# Patient Record
Sex: FEMALE | Race: WHITE | NOT HISPANIC OR LATINO | Employment: OTHER | URBAN - NONMETROPOLITAN AREA
[De-identification: names, ages, dates, MRNs, and addresses within clinical notes are randomized per-mention and may not be internally consistent; named-entity substitution may affect disease eponyms.]

---

## 2018-08-21 ENCOUNTER — OFFICE VISIT (OUTPATIENT)
Dept: CARDIOLOGY | Facility: CLINIC | Age: 73
End: 2018-08-21

## 2018-08-21 VITALS
WEIGHT: 144 LBS | HEART RATE: 89 BPM | HEIGHT: 63 IN | DIASTOLIC BLOOD PRESSURE: 80 MMHG | OXYGEN SATURATION: 97 % | SYSTOLIC BLOOD PRESSURE: 150 MMHG | BODY MASS INDEX: 25.52 KG/M2

## 2018-08-21 DIAGNOSIS — I10 ESSENTIAL HYPERTENSION: ICD-10-CM

## 2018-08-21 DIAGNOSIS — R00.2 PALPITATIONS: ICD-10-CM

## 2018-08-21 DIAGNOSIS — E66.3 OVERWEIGHT (BMI 25.0-29.9): ICD-10-CM

## 2018-08-21 DIAGNOSIS — R06.02 SHORTNESS OF BREATH: Primary | ICD-10-CM

## 2018-08-21 PROCEDURE — 99204 OFFICE O/P NEW MOD 45 MIN: CPT | Performed by: INTERNAL MEDICINE

## 2018-08-21 RX ORDER — MULTIPLE VITAMINS W/ MINERALS TAB 9MG-400MCG
1 TAB ORAL DAILY
COMMUNITY

## 2018-08-21 RX ORDER — LEVOTHYROXINE SODIUM 0.05 MG/1
50 TABLET ORAL DAILY
COMMUNITY

## 2018-08-21 RX ORDER — CLONAZEPAM 1 MG/1
1 TABLET ORAL NIGHTLY PRN
COMMUNITY

## 2018-08-21 RX ORDER — FUROSEMIDE 20 MG/1
20 TABLET ORAL AS NEEDED
COMMUNITY

## 2018-08-21 RX ORDER — ESCITALOPRAM OXALATE 20 MG/1
20 TABLET ORAL EVERY MORNING
COMMUNITY

## 2018-08-21 RX ORDER — LORATADINE 10 MG/1
10 TABLET ORAL DAILY
COMMUNITY

## 2018-08-21 RX ORDER — AMITRIPTYLINE HYDROCHLORIDE 10 MG/1
10 TABLET, FILM COATED ORAL DAILY
COMMUNITY

## 2018-08-21 RX ORDER — RANITIDINE 300 MG/1
300 TABLET ORAL NIGHTLY
COMMUNITY

## 2018-08-21 RX ORDER — DEXLANSOPRAZOLE 60 MG/1
60 CAPSULE, DELAYED RELEASE ORAL DAILY
COMMUNITY

## 2018-08-21 RX ORDER — BUSPIRONE HYDROCHLORIDE 10 MG/1
10 TABLET ORAL 2 TIMES DAILY
COMMUNITY

## 2018-08-21 RX ORDER — AMLODIPINE BESYLATE 5 MG/1
5 TABLET ORAL DAILY
COMMUNITY

## 2018-08-22 PROCEDURE — 93000 ELECTROCARDIOGRAM COMPLETE: CPT | Performed by: INTERNAL MEDICINE

## 2018-08-28 ENCOUNTER — HOSPITAL ENCOUNTER (OUTPATIENT)
Dept: CARDIOLOGY | Facility: HOSPITAL | Age: 73
Discharge: HOME | End: 2018-08-28
Attending: INTERNAL MEDICINE | Admitting: INTERNAL MEDICINE

## 2018-08-28 VITALS
DIASTOLIC BLOOD PRESSURE: 80 MMHG | WEIGHT: 143.96 LBS | BODY MASS INDEX: 25.51 KG/M2 | HEIGHT: 63 IN | SYSTOLIC BLOOD PRESSURE: 150 MMHG

## 2018-08-28 DIAGNOSIS — R06.02 SHORTNESS OF BREATH: ICD-10-CM

## 2018-08-28 PROCEDURE — 93306 TTE W/DOPPLER COMPLETE: CPT

## 2018-08-28 PROCEDURE — 93306 TTE W/DOPPLER COMPLETE: CPT | Performed by: INTERNAL MEDICINE

## 2018-08-28 PROCEDURE — 25010000002 PERFLUTREN 6.52 MG/ML SUSPENSION: Performed by: INTERNAL MEDICINE

## 2018-08-28 RX ADMIN — PERFLUTREN: 6.52 INJECTION, SUSPENSION INTRAVENOUS at 10:03

## 2018-08-30 LAB
BH CV ECHO MEAS - AI DEC SLOPE: 136 CM/SEC^2
BH CV ECHO MEAS - AI MAX PG: 50.7 MMHG
BH CV ECHO MEAS - AI MAX VEL: 356 CM/SEC
BH CV ECHO MEAS - AI P1/2T: 766.7 MSEC
BH CV ECHO MEAS - AO MAX PG (FULL): 4.9 MMHG
BH CV ECHO MEAS - AO MAX PG: 10.1 MMHG
BH CV ECHO MEAS - AO MEAN PG (FULL): 0.5 MMHG
BH CV ECHO MEAS - AO MEAN PG: 4.5 MMHG
BH CV ECHO MEAS - AO ROOT AREA (BSA CORRECTED): 1.6
BH CV ECHO MEAS - AO ROOT AREA: 5.3 CM^2
BH CV ECHO MEAS - AO ROOT DIAM: 2.6 CM
BH CV ECHO MEAS - AO V2 MAX: 159 CM/SEC
BH CV ECHO MEAS - AO V2 MEAN: 91.3 CM/SEC
BH CV ECHO MEAS - AO V2 VTI: 27.8 CM
BH CV ECHO MEAS - AVA(I,A): 3.6 CM^2
BH CV ECHO MEAS - AVA(I,D): 3.6 CM^2
BH CV ECHO MEAS - AVA(V,A): 2.2 CM^2
BH CV ECHO MEAS - AVA(V,D): 2.2 CM^2
BH CV ECHO MEAS - BSA(HAYCOCK): 1.7 M^2
BH CV ECHO MEAS - BSA: 1.7 M^2
BH CV ECHO MEAS - BZI_BMI: 25.3 KILOGRAMS/M^2
BH CV ECHO MEAS - BZI_METRIC_HEIGHT: 160 CM
BH CV ECHO MEAS - BZI_METRIC_WEIGHT: 64.9 KG
BH CV ECHO MEAS - EDV(CUBED): 78.4 ML
BH CV ECHO MEAS - EDV(MOD-SP4): 92.8 ML
BH CV ECHO MEAS - EDV(TEICH): 82.2 ML
BH CV ECHO MEAS - EF(CUBED): 69.2 %
BH CV ECHO MEAS - EF(MOD-SP4): 70.5 %
BH CV ECHO MEAS - EF(TEICH): 61.1 %
BH CV ECHO MEAS - ESV(CUBED): 24.1 ML
BH CV ECHO MEAS - ESV(MOD-SP4): 27.4 ML
BH CV ECHO MEAS - ESV(TEICH): 31.9 ML
BH CV ECHO MEAS - FS: 32.5 %
BH CV ECHO MEAS - IVS/LVPW: 1.5
BH CV ECHO MEAS - IVSD: 1 CM
BH CV ECHO MEAS - LA DIMENSION: 3.4 CM
BH CV ECHO MEAS - LA/AO: 1.3
BH CV ECHO MEAS - LAT PEAK E' VEL: 8.5 CM/SEC
BH CV ECHO MEAS - LV DIASTOLIC VOL/BSA (35-75): 55.3 ML/M^2
BH CV ECHO MEAS - LV MASS(C)D: 115.4 GRAMS
BH CV ECHO MEAS - LV MASS(C)DI: 68.8 GRAMS/M^2
BH CV ECHO MEAS - LV MAX PG: 5.2 MMHG
BH CV ECHO MEAS - LV MEAN PG: 4 MMHG
BH CV ECHO MEAS - LV SYSTOLIC VOL/BSA (12-30): 16.3 ML/M^2
BH CV ECHO MEAS - LV V1 MAX: 111.5 CM/SEC
BH CV ECHO MEAS - LV V1 MEAN: 92.8 CM/SEC
BH CV ECHO MEAS - LV V1 VTI: 31.6 CM
BH CV ECHO MEAS - LVIDD: 4.3 CM
BH CV ECHO MEAS - LVIDS: 2.9 CM
BH CV ECHO MEAS - LVLD AP4: 7.7 CM
BH CV ECHO MEAS - LVLS AP4: 6.2 CM
BH CV ECHO MEAS - LVOT AREA (M): 3.1 CM^2
BH CV ECHO MEAS - LVOT AREA: 3.1 CM^2
BH CV ECHO MEAS - LVOT DIAM: 2 CM
BH CV ECHO MEAS - LVPWD: 0.69 CM
BH CV ECHO MEAS - MED PEAK E' VEL: 7.29 CM/SEC
BH CV ECHO MEAS - MR MAX PG: 89.3 MMHG
BH CV ECHO MEAS - MR MAX VEL: 472.5 CM/SEC
BH CV ECHO MEAS - MV A MAX VEL: 110 CM/SEC
BH CV ECHO MEAS - MV DEC TIME: 0.23 SEC
BH CV ECHO MEAS - MV E MAX VEL: 91.7 CM/SEC
BH CV ECHO MEAS - MV E/A: 0.83
BH CV ECHO MEAS - PI END-D VEL: 105 CM/SEC
BH CV ECHO MEAS - RAP SYSTOLE: 5 MMHG
BH CV ECHO MEAS - RVSP: 25.1 MMHG
BH CV ECHO MEAS - SI(AO): 88 ML/M^2
BH CV ECHO MEAS - SI(CUBED): 32.4 ML/M^2
BH CV ECHO MEAS - SI(LVOT): 59.2 ML/M^2
BH CV ECHO MEAS - SI(MOD-SP4): 39 ML/M^2
BH CV ECHO MEAS - SI(TEICH): 29.9 ML/M^2
BH CV ECHO MEAS - SV(AO): 147.6 ML
BH CV ECHO MEAS - SV(CUBED): 54.3 ML
BH CV ECHO MEAS - SV(LVOT): 99.3 ML
BH CV ECHO MEAS - SV(MOD-SP4): 65.4 ML
BH CV ECHO MEAS - SV(TEICH): 50.2 ML
BH CV ECHO MEAS - TR MAX VEL: 224 CM/SEC
BH CV ECHO MEASUREMENTS AVERAGE E/E' RATIO: 11.61
LEFT ATRIUM VOLUME INDEX: 17.7 ML/M2
LEFT ATRIUM VOLUME: 29.7 CM3
MAXIMAL PREDICTED HEART RATE: 147 BPM
STRESS TARGET HR: 125 BPM

## 2019-03-05 ENCOUNTER — OFFICE VISIT (OUTPATIENT)
Dept: CARDIOLOGY | Facility: CLINIC | Age: 74
End: 2019-03-05

## 2019-03-05 VITALS
OXYGEN SATURATION: 98 % | WEIGHT: 145 LBS | HEART RATE: 76 BPM | SYSTOLIC BLOOD PRESSURE: 158 MMHG | HEIGHT: 63 IN | BODY MASS INDEX: 25.69 KG/M2 | DIASTOLIC BLOOD PRESSURE: 80 MMHG

## 2019-03-05 DIAGNOSIS — I10 ESSENTIAL HYPERTENSION: ICD-10-CM

## 2019-03-05 DIAGNOSIS — I35.1 NONRHEUMATIC AORTIC VALVE INSUFFICIENCY: Primary | ICD-10-CM

## 2019-03-05 DIAGNOSIS — R00.2 PALPITATIONS: ICD-10-CM

## 2019-03-05 PROCEDURE — 93000 ELECTROCARDIOGRAM COMPLETE: CPT | Performed by: INTERNAL MEDICINE

## 2019-03-05 PROCEDURE — 99214 OFFICE O/P EST MOD 30 MIN: CPT | Performed by: INTERNAL MEDICINE

## 2019-03-05 RX ORDER — MELOXICAM 15 MG/1
15 TABLET ORAL DAILY
COMMUNITY

## 2019-03-05 NOTE — PROGRESS NOTES
Subjective:     Encounter Date:03/05/2019      Patient ID: Merna Bauer is a 74 y.o. female with mild aortic insufficiency, chronic intermittent palpitations, essential hypertension who presents today for follow-up.    Chief Complaint: Follow-up    Hypertension   This is a chronic problem. The problem is unchanged. The problem is controlled. Associated symptoms include anxiety. Pertinent negatives include no chest pain, headaches, malaise/fatigue, orthopnea, palpitations, peripheral edema, PND or shortness of breath. Agents associated with hypertension include thyroid hormones. Risk factors for coronary artery disease include post-menopausal state. Current antihypertension treatment includes calcium channel blockers. The current treatment provides significant improvement. There are no compliance problems.  There is no history of CAD/MI or heart failure.      This patient presents today for follow-up.  We last saw her at a time when she was having shortness of breath.  She says that her shortness of breath has essentially resolved.  She also has some intermittent palpitations.  These have been rarely occurring at this time.  She thinks that most of her symptoms may have been related to anxiety.  Her  was dealing with a lot of health issues and the patient was quite anxious at that time.  She was placed on medications by Dr. Schuler for her anxiety and she says that her symptoms have almost completely resolved at this time.  She denies chest pain, lightheadedness, dizziness, syncope, orthopnea, PND, significant shortness of breath or dyspnea on exertion.  She has not had any trouble with her medications.  She tells me that her blood pressure is generally well controlled on her current medications.  Overall, she says that she is feeling well at this time.      Current Outpatient Medications:   •  amitriptyline (ELAVIL) 10 MG tablet, Take 10 mg by mouth Daily. Take 2 tablets daily, Disp: , Rfl:   •   amLODIPine (NORVASC) 5 MG tablet, Take 5 mg by mouth Daily., Disp: , Rfl:   •  busPIRone (BUSPAR) 10 MG tablet, Take 10 mg by mouth 2 (Two) Times a Day., Disp: , Rfl:   •  Calcium Carb-Cholecalciferol (CALCIUM 600 + D PO), Take 1 tablet by mouth Daily., Disp: , Rfl:   •  clonazePAM (KlonoPIN) 1 MG tablet, Take 1 mg by mouth At Night As Needed for Seizures., Disp: , Rfl:   •  dexlansoprazole (DEXILANT) 60 MG capsule, Take 60 mg by mouth Daily., Disp: , Rfl:   •  escitalopram (LEXAPRO) 20 MG tablet, Take 20 mg by mouth Every Morning., Disp: , Rfl:   •  furosemide (LASIX) 20 MG tablet, Take 20 mg by mouth As Needed., Disp: , Rfl:   •  levothyroxine (SYNTHROID, LEVOTHROID) 50 MCG tablet, Take 50 mcg by mouth Daily., Disp: , Rfl:   •  loratadine (CLARITIN) 10 MG tablet, Take 10 mg by mouth Daily., Disp: , Rfl:   •  meloxicam (MOBIC) 15 MG tablet, Take 15 mg by mouth Daily., Disp: , Rfl:   •  Multiple Vitamins-Minerals (MULTIVITAMIN WITH MINERALS) tablet tablet, Take 1 tablet by mouth Daily., Disp: , Rfl:   •  Omega-3 Fatty Acids (FISH OIL) 1200 MG capsule delayed-release, Take 1 capsule by mouth 2 (Two) Times a Day., Disp: , Rfl:   •  raNITIdine (ZANTAC) 300 MG tablet, Take 300 mg by mouth Every Night., Disp: , Rfl:     No Known Allergies    Social History     Tobacco Use   • Smoking status: Never Smoker   • Smokeless tobacco: Never Used   Substance Use Topics   • Alcohol use: No     Review of Systems   Constitution: Negative for chills, fever, weakness, malaise/fatigue, night sweats and weight loss.   HENT: Negative for congestion and sore throat.    Cardiovascular: Negative for chest pain, claudication, dyspnea on exertion, irregular heartbeat, leg swelling, orthopnea, palpitations, paroxysmal nocturnal dyspnea and syncope.   Respiratory: Negative for cough, hemoptysis, shortness of breath and wheezing.    Endocrine: Negative for cold intolerance, heat intolerance, polydipsia and polyuria.   Hematologic/Lymphatic:  "Negative for bleeding problem. Does not bruise/bleed easily.   Gastrointestinal: Negative for abdominal pain, hematemesis, hematochezia, melena, nausea and vomiting.   Genitourinary: Negative for bladder incontinence, dysuria and hematuria.   Neurological: Negative for dizziness, headaches, loss of balance, numbness, paresthesias and seizures.       ECG 12 Lead  Date/Time: 3/5/2019 3:06 PM  Performed by: Saud Nunn MD  Authorized by: Saud Nunn MD   Comparison: compared with previous ECG from 8/28/2018  Similar to previous ECG  Rhythm: sinus rhythm  Rate: normal  BPM: 70  Conduction: 1st degree AV block  ST Segments: ST segments normal  T Waves: T waves normal  QRS axis: normal  Other: no other findings    Clinical impression: non-specific ECG               Objective:     Physical Exam   Constitutional: She is oriented to person, place, and time. She appears well-developed and well-nourished. No distress.   HENT:   Head: Normocephalic and atraumatic.   Mouth/Throat: Oropharynx is clear and moist.   Eyes: EOM are normal. Pupils are equal, round, and reactive to light.   Neck: Normal range of motion. Neck supple. No JVD present. No thyromegaly present.   Cardiovascular: Normal rate, regular rhythm, S1 normal, S2 normal, normal heart sounds and intact distal pulses. Exam reveals no gallop and no friction rub.   No murmur heard.  Pulmonary/Chest: Effort normal and breath sounds normal.   Abdominal: Soft. Bowel sounds are normal. She exhibits no distension. There is no tenderness.   Musculoskeletal: Normal range of motion. She exhibits no edema.   Neurological: She is alert and oriented to person, place, and time. No cranial nerve deficit.   Skin: Skin is warm and dry. No rash noted. No cyanosis or erythema. Nails show no clubbing.   Psychiatric: She has a normal mood and affect.   Vitals reviewed.    /80 (BP Location: Right arm, Patient Position: Sitting)   Pulse 76   Ht 160 cm (63\")   " Wt 65.8 kg (145 lb)   SpO2 98%   BMI 25.69 kg/m²     Data/Lab Review:     Echo 8/28/18:  · Left ventricular systolic function is normal. Estimated EF appears to be in the range of 61 - 65%.  · Mild aortic valve regurgitation is present.  · Trace mitral valve regurgitation is present.  · Trace tricuspid valve regurgitation is present. Estimated right ventricular systolic pressure from tricuspid regurgitation is normal (<35 mmHg).      Assessment:          Diagnosis Plan   1. Nonrheumatic aortic valve insufficiency     2. Essential hypertension     3. Palpitations  ECG 12 Lead          Plan:       1.  Aortic insufficiency: The patient had mild aortic insufficiency noticed on her echocardiogram in August 2018.  At this time, she is stable.  She can be followed as needed but was instructed to call or return if she develops a rapid increase in shortness of breath or dyspnea on exertion or any other worrisome symptoms.    2.  Essential hypertension: The patient tells me that her blood pressure is generally very well controlled on her current dose of Norvasc.  Therefore, given that she normally has good control blood pressure, we will not make adjustments based on today's reading which was somewhat elevated.    3.  Palpitations: The patient has a long-standing history of intermittent palpitations but she experiences these very rarely at this time and her symptoms are markedly improved.    Patient's Body mass index is 25.69 kg/m². BMI is above normal parameters. Recommendations include: exercise counseling and nutrition counseling.    Follow-up: as needed at this time at request of patient; however we will be happy to see again if needed.

## 2023-09-25 ENCOUNTER — HOSPITAL ENCOUNTER (EMERGENCY)
Facility: HOSPITAL | Age: 78
Discharge: HOME | End: 2023-09-25
Admitting: EMERGENCY MEDICINE
Payer: MEDICARE

## 2023-09-25 ENCOUNTER — APPOINTMENT (OUTPATIENT)
Dept: CT IMAGING | Facility: HOSPITAL | Age: 78
End: 2023-09-25
Payer: MEDICARE

## 2023-09-25 ENCOUNTER — APPOINTMENT (OUTPATIENT)
Dept: GENERAL RADIOLOGY | Facility: HOSPITAL | Age: 78
End: 2023-09-25
Payer: MEDICARE

## 2023-09-25 VITALS
RESPIRATION RATE: 16 BRPM | TEMPERATURE: 98.7 F | SYSTOLIC BLOOD PRESSURE: 125 MMHG | DIASTOLIC BLOOD PRESSURE: 47 MMHG | HEIGHT: 63 IN | HEART RATE: 68 BPM | OXYGEN SATURATION: 96 % | WEIGHT: 125 LBS | BODY MASS INDEX: 22.15 KG/M2

## 2023-09-25 DIAGNOSIS — S32.592A CLOSED FRACTURE OF LEFT INFERIOR PUBIC RAMUS, INITIAL ENCOUNTER: Primary | ICD-10-CM

## 2023-09-25 DIAGNOSIS — J06.9 UPPER RESPIRATORY TRACT INFECTION, UNSPECIFIED TYPE: ICD-10-CM

## 2023-09-25 PROCEDURE — 73552 X-RAY EXAM OF FEMUR 2/>: CPT

## 2023-09-25 PROCEDURE — 63710000001 ONDANSETRON ODT 4 MG TABLET DISPERSIBLE: Performed by: NURSE PRACTITIONER

## 2023-09-25 PROCEDURE — 99284 EMERGENCY DEPT VISIT MOD MDM: CPT

## 2023-09-25 PROCEDURE — 72192 CT PELVIS W/O DYE: CPT

## 2023-09-25 RX ORDER — OXYCODONE AND ACETAMINOPHEN 7.5; 325 MG/1; MG/1
1 TABLET ORAL ONCE
Status: COMPLETED | OUTPATIENT
Start: 2023-09-25 | End: 2023-09-25

## 2023-09-25 RX ORDER — AMOXICILLIN 500 MG/1
500 CAPSULE ORAL 2 TIMES DAILY
Qty: 20 CAPSULE | Refills: 0 | Status: SHIPPED | OUTPATIENT
Start: 2023-09-25 | End: 2023-10-05

## 2023-09-25 RX ORDER — ONDANSETRON 4 MG/1
4 TABLET, ORALLY DISINTEGRATING ORAL ONCE
Status: COMPLETED | OUTPATIENT
Start: 2023-09-25 | End: 2023-09-25

## 2023-09-25 RX ORDER — HYDROCODONE BITARTRATE AND ACETAMINOPHEN 5; 325 MG/1; MG/1
1 TABLET ORAL EVERY 4 HOURS PRN
Qty: 15 TABLET | Refills: 0 | Status: SHIPPED | OUTPATIENT
Start: 2023-09-25

## 2023-09-25 RX ADMIN — OXYCODONE HYDROCHLORIDE AND ACETAMINOPHEN 1 TABLET: 7.5; 325 TABLET ORAL at 15:15

## 2023-09-25 RX ADMIN — ONDANSETRON 4 MG: 4 TABLET, ORALLY DISINTEGRATING ORAL at 15:15

## 2023-09-25 NOTE — DISCHARGE INSTRUCTIONS
You have a closed fracture of the left inferior pubic ramus- same site from previous fracture (acute on chronic)    Weight bearing as tolerated using your walker, call [facility] for appt- Dr. Gray is on call today for the ER who I spoke to regarding your CT scan however as discussed they may put you with a different provider when you get your appointment

## 2023-09-25 NOTE — ED PROVIDER NOTES
Subjective   History of Present Illness  Patient is a 78-year-old female who presents to the ER with chief complaints of left pelvis pain and leg pain.  Patient states that she sustained a pelvic fracture when she fell back in July of this year.  She states that the fracture was not identified until she had a CT scan performed.  Patient states today she was in her son room and turned awkwardly.  She believes she got tripped up on her footing causing her to fall on her left side.  She complains of pain in particular to her left groin and pelvis region.  She reports pain to her left upper leg.  She is uncertain if she hit her head, states she may have bumped her head however had no loss of consciousness and has had no vomiting or headache symptoms.  She is not on a blood thinner.  We offered to further evaluate her head however she is declining any imaging of the head at this time.  She has no neck pain or back pain.  Denies any loss of bowel or bladder control or saddle anesthesia.  She is concerned about another pelvic fracture.  Past medical history significant for COPD, hypertension, thyroid disease      Review of Systems   Constitutional: Negative.  Negative for fever.   HENT: Negative.  Negative for congestion.    Cardiovascular: Negative.  Negative for chest pain.   Gastrointestinal: Negative.  Negative for abdominal pain, diarrhea, nausea and vomiting.   Genitourinary: Negative.  Negative for dysuria.   Musculoskeletal:  Negative for back pain and neck pain.        Positive for left pelvic pain and left leg pain   Skin: Negative.  Negative for color change and rash.   Neurological: Negative.  Negative for weakness.   All other systems reviewed and are negative.    Past Medical History:   Diagnosis Date    COPD (chronic obstructive pulmonary disease)     Hypertension     Thyroid disease        No Known Allergies    Past Surgical History:   Procedure Laterality Date    CATARACT EXTRACTION Bilateral      DILATATION AND CURETTAGE      x 2    ILEOSTOMY      REFRACTIVE SURGERY Bilateral        Family History   Problem Relation Age of Onset    Bipolar disorder Mother     Heart attack Father     Sick sinus syndrome Sister     Heart disease Brother     Sick sinus syndrome Brother        Social History     Socioeconomic History    Marital status:    Tobacco Use    Smoking status: Never    Smokeless tobacco: Never   Substance and Sexual Activity    Alcohol use: No    Drug use: No    Sexual activity: Defer           Objective   Physical Exam  Vitals and nursing note reviewed.   Constitutional:       Appearance: Normal appearance. She is well-developed.   HENT:      Head: Normocephalic and atraumatic.      Comments: No obvious trauma noted to the head     Right Ear: External ear normal.      Left Ear: External ear normal.      Nose: Nose normal.      Mouth/Throat:      Pharynx: Oropharynx is clear.   Eyes:      Extraocular Movements: Extraocular movements intact.      Conjunctiva/sclera: Conjunctivae normal.      Pupils: Pupils are equal, round, and reactive to light.   Cardiovascular:      Rate and Rhythm: Normal rate and regular rhythm.      Heart sounds: Normal heart sounds.   Pulmonary:      Effort: Pulmonary effort is normal.      Breath sounds: Normal breath sounds.   Abdominal:      General: Bowel sounds are normal.      Palpations: Abdomen is soft.   Musculoskeletal:         General: Tenderness and signs of injury present.      Cervical back: Normal range of motion and neck supple.      Comments: Pain to palpation to the left groin, no significant pain noted to the left hip, range of motion intact, has pain to palpation to the left upper thigh, pulses palpable bilaterally, she is neurologically neurovascularly intact, denies any loss of bowel or bladder control or saddle anesthesia    No pain to palpation to the cervical spine, thoracic spine, or lumbar spine   Skin:     General: Skin is warm and dry.       Capillary Refill: Capillary refill takes less than 2 seconds.   Neurological:      General: No focal deficit present.      Mental Status: She is alert and oriented to person, place, and time.   Psychiatric:         Mood and Affect: Mood normal.         Behavior: Behavior normal.         Thought Content: Thought content normal.         Judgment: Judgment normal.       Procedures           ED Course                                           Medical Decision Making  Patient is a 78-year-old female who presents to the ER with chief complaints of left pelvis pain and leg pain.  Patient states that she sustained a pelvic fracture when she fell back in July of this year.  She states that the fracture was not identified until she had a CT scan performed.  Patient states today she was in her son room and turned awkwardly.  She believes she got tripped up on her footing causing her to fall on her left side.  She complains of pain in particular to her left groin and pelvis region.  She reports pain to her left upper leg.  She is uncertain if she hit her head, states she may have bumped her head however had no loss of consciousness and has had no vomiting or headache symptoms.  She is not on a blood thinner.  We offered to further evaluate her head however she is declining any imaging of the head at this time.  She has no neck pain or back pain.  Denies any loss of bowel or bladder control or saddle anesthesia.  She is concerned about another pelvic fracture.  Past medical history significant for COPD, hypertension, thyroid disease  Differential diagnosis: Bony fracture, dislocation, contusion, sprain, and other    XR Femur 2 View Left   Final Result    1. No femur fracture is identified, there are partially healed fractures    of the superior and inferior left pubic rami, on today's pelvic CT there    is suggestion of  refracture of the inferior left pubic ramus fracture.         This report was signed and finalized on  9/25/2023 3:37 PM CDT by Dr. Smith Gary MD.          CT Pelvis Without Contrast   Final Result    1. Old fractures of the anterior column of the left acetabulum and    inferior left pubic ramus as well as the left sacral wing. There is    questionable acute on chronic fracture involving the left inferior pubic    ramus at the same location as the original fracture. No significant    displacement is identified. No hip fracture is seen.                   This report was signed and finalized on 9/25/2023 2:44 PM CDT by Dr. Smith Gary MD.       CT scan findings are negative for hip fracture however there appears to be an acute on chronic closed fracture of the left inferior pubic ramus.  Discussed with Dr. Gray on-call for orthopedics.  He wants patient to be weightbearing as tolerated.  We will prescribe pain medication and she may follow-up as an outpatient in their office.  Patient states she has a walker that she use the last time she had a pelvic fracture.  We offered to write a prescription for a wheelchair however patient prefers to use her walker.  Patient upon discharge is requesting an antibiotic for upper respiratory infection.  She states she has tried over-the-counter medication without relief.  We will prescribe antibiotics and she may follow-up with her PCP regarding the symptoms.  Patient be discharged home shortly in stable condition.    Problems Addressed:  Closed fracture of left inferior pubic ramus, initial encounter: acute illness or injury  Upper respiratory tract infection, unspecified type: acute illness or injury    Amount and/or Complexity of Data Reviewed  Radiology: ordered. Decision-making details documented in ED Course.    Risk  Prescription drug management.        Final diagnoses:   Closed fracture of left inferior pubic ramus, initial encounter   Upper respiratory tract infection, unspecified type       ED Disposition  ED Disposition       ED Disposition   Discharge     Condition   Stable    Comment   --               Maximino Gray MD  [address] [facility] [address]  [phone]      call tomorrow for appt         Medication List        New Prescriptions      amoxicillin 500 MG capsule  Commonly known as: AMOXIL  Take 1 capsule by mouth 2 (Two) Times a Day for 10 days.     HYDROcodone-acetaminophen 5-325 MG per tablet  Commonly known as: NORCO  Take 1 tablet by mouth Every 4 (Four) Hours As Needed for Moderate Pain.               Where to Get Your Medications        These medications were sent to [facility] - [place], [place] - [address] - [phone]  - [phone]   [address] [address]      Phone: [phone]   amoxicillin 500 MG capsule  HYDROcodone-acetaminophen 5-325 MG per tablet            Mira Johnston, APRN  09/25/23 0813

## 2023-10-23 NOTE — PROGRESS NOTES
Subjective    Ms. Bauer is 78 y.o. female    Chief Complaint: recurrent uti    History of Present Illness    78-year-old female new patient referred for recurrent urinary tract infections over the past 2 years.  Reports has had approximately 7 this year.  3 urine culture positive available for me to review 2 of which showing Klebsiella and 1 E. coli with resistance to Bactrim, ampicillin and tetracycline.  Along with intermediate coverage to nitrofurantoin.  Currently using estradiol vaginal estrogen cream 2-3 nights weekly as well as over-the-counter probiotic.  Reports recently over the past 2 days finished a round of Levaquin and prior to that was prescribed Bactrim earlier this month.  Is currently completely asymptomatic.  Reports when symptoms are present consist of suprapubic pressure urine frequency and urgency.  Denies any urinary incontinence.    The following portions of the patient's history were reviewed and updated as appropriate: allergies, current medications, past family history, past medical history, past social history, past surgical history and problem list.    Review of Systems   Constitutional:  Negative for chills, fatigue and fever.   Gastrointestinal:  Negative for abdominal pain, anal bleeding, blood in stool, nausea and vomiting.   Genitourinary:  Positive for difficulty urinating, frequency and urgency. Negative for dysuria, flank pain, hematuria, pelvic pain and vaginal pain.         Current Outpatient Medications:     amLODIPine (NORVASC) 5 MG tablet, Take 1 tablet by mouth Daily., Disp: , Rfl:     busPIRone (BUSPAR) 10 MG tablet, Take 1 tablet by mouth 2 (Two) Times a Day., Disp: , Rfl:     Calcium Carb-Cholecalciferol (CALCIUM 600 + D PO), Take 1 tablet by mouth Daily., Disp: , Rfl:     citalopram (CeleXA) 40 MG tablet, Take 1 tablet by mouth Daily., Disp: , Rfl:     clonazePAM (KlonoPIN) 1 MG tablet, Take 1 tablet by mouth At Night As Needed for Seizures., Disp: , Rfl:      dexlansoprazole (DEXILANT) 60 MG capsule, Take 1 capsule by mouth Daily., Disp: , Rfl:     furosemide (LASIX) 20 MG tablet, Take 1 tablet by mouth As Needed., Disp: , Rfl:     levothyroxine (SYNTHROID, LEVOTHROID) 50 MCG tablet, Take 1 tablet by mouth Daily., Disp: , Rfl:     loratadine (CLARITIN) 10 MG tablet, Take 1 tablet by mouth Daily., Disp: , Rfl:     metoprolol tartrate (LOPRESSOR) 25 MG tablet, Take 1 tablet by mouth 2 (Two) Times a Day., Disp: , Rfl:     Multiple Vitamins-Minerals (MULTIVITAMIN WITH MINERALS) tablet tablet, Take 1 tablet by mouth Daily., Disp: , Rfl:     Omega-3 Fatty Acids (FISH OIL) 1200 MG capsule delayed-release, Take 1 capsule by mouth 2 (Two) Times a Day., Disp: , Rfl:     amitriptyline (ELAVIL) 10 MG tablet, Take 10 mg by mouth Daily. Take 2 tablets daily (Patient not taking: Reported on 10/30/2023), Disp: , Rfl:     cephalexin (KEFLEX) 250 MG capsule, Take 1 capsule by mouth Daily for 10 days., Disp: 90 capsule, Rfl: 0    escitalopram (LEXAPRO) 20 MG tablet, Take 20 mg by mouth Every Morning. (Patient not taking: Reported on 10/30/2023), Disp: , Rfl:     HYDROcodone-acetaminophen (NORCO) 5-325 MG per tablet, Take 1 tablet by mouth Every 4 (Four) Hours As Needed for Moderate Pain. (Patient not taking: Reported on 10/30/2023), Disp: 15 tablet, Rfl: 0    meloxicam (MOBIC) 15 MG tablet, Take 15 mg by mouth Daily. (Patient not taking: Reported on 10/30/2023), Disp: , Rfl:     raNITIdine (ZANTAC) 300 MG tablet, Take 300 mg by mouth Every Night. (Patient not taking: Reported on 10/30/2023), Disp: , Rfl:     Past Medical History:   Diagnosis Date    COPD (chronic obstructive pulmonary disease)     Hypertension     Thyroid disease        Past Surgical History:   Procedure Laterality Date    CATARACT EXTRACTION Bilateral     DILATATION AND CURETTAGE      x 2    ILEOSTOMY      REFRACTIVE SURGERY Bilateral        Social History     Socioeconomic History    Marital status:    Tobacco Use  "   Smoking status: Never    Smokeless tobacco: Never   Substance and Sexual Activity    Alcohol use: No    Drug use: No    Sexual activity: Defer       Family History   Problem Relation Age of Onset    Bipolar disorder Mother     Heart attack Father     Sick sinus syndrome Sister     Heart disease Brother     Sick sinus syndrome Brother        Objective    Temp 97.8 °F (36.6 °C)   Ht 160 cm (62.99\")   Wt 56.9 kg (125 lb 6.4 oz)   BMI 22.22 kg/m²     Physical Exam  Nursing note reviewed.   Constitutional:       General: She is not in acute distress.     Appearance: Normal appearance. She is not ill-appearing.   HENT:      Nose: No congestion.   Abdominal:      Tenderness: There is no right CVA tenderness or left CVA tenderness.      Hernia: No hernia is present.      Comments: Ileostomy bag   Skin:     General: Skin is warm and dry.   Neurological:      Mental Status: She is alert and oriented to person, place, and time.   Psychiatric:         Mood and Affect: Mood normal.         Behavior: Behavior normal.             Results for orders placed or performed in visit on 10/30/23   POC Urinalysis Dipstick, Multipro    Specimen: Urine   Result Value Ref Range    Color Yellow Yellow, Straw, Dark Yellow, Emilie    Clarity, UA Clear Clear    Glucose, UA Negative Negative mg/dL    Bilirubin Negative Negative    Ketones, UA Negative Negative    Specific Gravity  1.030 1.005 - 1.030    Blood, UA Negative Negative    pH, Urine 5.5 5.0 - 8.0    Protein, POC Negative Negative mg/dL    Urobilinogen, UA 0.2 E.U./dL Normal, 0.2 E.U./dL    Nitrite, UA Negative Negative    Leukocytes Negative Negative     Assessment and Plan    Diagnoses and all orders for this visit:    1. Recurrent UTI (Primary)  -     POC Urinalysis Dipstick, Multipro  -     cephalexin (KEFLEX) 250 MG capsule; Take 1 capsule by mouth Daily for 10 days.  Dispense: 90 capsule; Refill: 0        Recurrent UTI    Continue vaginal estrogen cream 2-3 nights " weekly    Continue OTC probiotic and recommend adding in cranberry daily    Urinalysis at present clear no signs of infection given the frequent recurrence of infection recommend starting on a low-dose daily prophylaxis    Given most recent urine culture we will start patient on low-dose cephalexin 250 mg x 3 months    Previously underwent abdominal ultrasound imaging which revealed no renal stones or masses or hydronephrosis.    If infections continue recommend further evaluation with cystoscopy      Follow-up in 3 months

## 2023-10-30 ENCOUNTER — OFFICE VISIT (OUTPATIENT)
Dept: UROLOGY | Facility: CLINIC | Age: 78
End: 2023-10-30
Payer: MEDICARE

## 2023-10-30 ENCOUNTER — PATIENT ROUNDING (BHMG ONLY) (OUTPATIENT)
Dept: UROLOGY | Facility: CLINIC | Age: 78
End: 2023-10-30
Payer: MEDICARE

## 2023-10-30 VITALS — HEIGHT: 63 IN | TEMPERATURE: 97.8 F | WEIGHT: 125.4 LBS | BODY MASS INDEX: 22.22 KG/M2

## 2023-10-30 DIAGNOSIS — N39.0 RECURRENT UTI: Primary | ICD-10-CM

## 2023-10-30 LAB
BILIRUB BLD-MCNC: NEGATIVE MG/DL
CLARITY, POC: CLEAR
COLOR UR: YELLOW
GLUCOSE UR STRIP-MCNC: NEGATIVE MG/DL
KETONES UR QL: NEGATIVE
LEUKOCYTE EST, POC: NEGATIVE
NITRITE UR-MCNC: NEGATIVE MG/ML
PH UR: 5.5 [PH] (ref 5–8)
PROT UR STRIP-MCNC: NEGATIVE MG/DL
RBC # UR STRIP: NEGATIVE /UL
SP GR UR: 1.03 (ref 1–1.03)
UROBILINOGEN UR QL: NORMAL

## 2023-10-30 PROCEDURE — 1160F RVW MEDS BY RX/DR IN RCRD: CPT

## 2023-10-30 PROCEDURE — 1159F MED LIST DOCD IN RCRD: CPT

## 2023-10-30 PROCEDURE — 81001 URINALYSIS AUTO W/SCOPE: CPT

## 2023-10-30 PROCEDURE — 99204 OFFICE O/P NEW MOD 45 MIN: CPT

## 2023-10-30 RX ORDER — CEPHALEXIN 250 MG/1
250 CAPSULE ORAL DAILY
Qty: 90 CAPSULE | Refills: 0 | Status: SHIPPED | OUTPATIENT
Start: 2023-10-30 | End: 2023-11-09

## 2023-10-30 RX ORDER — CITALOPRAM 40 MG/1
40 TABLET ORAL DAILY
COMMUNITY

## 2023-10-30 NOTE — PROGRESS NOTES
October 30, 2023    Hello, may I speak with Merna Bauer?    My name is Katie      I am  with [facility]  [address] [place] [address], [address]  [facility] [id number] [phone].    Before we get started may I verify your date of birth? 1945    I am calling to officially welcome you to our practice and ask about your recent visit. Is this a good time to talk? yes    Tell me about your visit with us. What things went well?  The visit went well and she was very informative.       We're always looking for ways to make our patients' experiences even better. Do you have recommendations on ways we may improve?  no    Overall were you satisfied with your first visit to our practice? yes       I appreciate you taking the time to speak with me today. Is there anything else I can do for you? no      Thank you, and have a great day.

## 2024-01-17 NOTE — PROGRESS NOTES
Subjective    Ms. Bauer is 79 y.o. female    Chief Complaint: Recurrent UTI follow-up    History of Present Illness      78-year-old female ne78-year-old female established patient in for 3-month follow-up regarding recurrent urinary tract infections.  Was started on low-dose Keflex prophylactic treatment last visit.  Reports 0 breakthrough infections.  Is currently asymptomatic.  Urinalysis is clear.  Has remained on vaginal estrogen cream weekly as well as over-the-counter probiotic and cranberry.  Denies any incontinence.    The following portions of the patient's history were reviewed and updated as appropriate: allergies, current medications, past family history, past medical history, past social history, past surgical history and problem list.    Review of Systems   Constitutional:  Negative for chills and fever.   Gastrointestinal:  Negative for abdominal pain, anal bleeding, blood in stool, nausea and vomiting.   Genitourinary:  Negative for difficulty urinating, dysuria, frequency, hematuria, pelvic pain, urgency and vaginal pain.         Current Outpatient Medications:     amitriptyline (ELAVIL) 10 MG tablet, Take 1 tablet by mouth Daily. Take 2 tablets daily, Disp: , Rfl:     amLODIPine (NORVASC) 5 MG tablet, Take 1 tablet by mouth Daily., Disp: , Rfl:     busPIRone (BUSPAR) 10 MG tablet, Take 1 tablet by mouth 2 (Two) Times a Day., Disp: , Rfl:     Calcium Carb-Cholecalciferol (CALCIUM 600 + D PO), Take 1 tablet by mouth Daily., Disp: , Rfl:     citalopram (CeleXA) 40 MG tablet, Take 1 tablet by mouth Daily., Disp: , Rfl:     clonazePAM (KlonoPIN) 1 MG tablet, Take 1 tablet by mouth At Night As Needed for Seizures., Disp: , Rfl:     dexlansoprazole (DEXILANT) 60 MG capsule, Take 1 capsule by mouth Daily., Disp: , Rfl:     escitalopram (LEXAPRO) 20 MG tablet, Take 1 tablet by mouth Every Morning., Disp: , Rfl:     furosemide (LASIX) 20 MG tablet, Take 1 tablet by mouth As Needed., Disp: , Rfl:      "HYDROcodone-acetaminophen (NORCO) 5-325 MG per tablet, Take 1 tablet by mouth Every 4 (Four) Hours As Needed for Moderate Pain., Disp: 15 tablet, Rfl: 0    levothyroxine (SYNTHROID, LEVOTHROID) 50 MCG tablet, Take 1 tablet by mouth Daily., Disp: , Rfl:     loratadine (CLARITIN) 10 MG tablet, Take 1 tablet by mouth Daily., Disp: , Rfl:     meloxicam (MOBIC) 15 MG tablet, Take 1 tablet by mouth Daily., Disp: , Rfl:     metoprolol tartrate (LOPRESSOR) 25 MG tablet, Take 1 tablet by mouth 2 (Two) Times a Day., Disp: , Rfl:     Multiple Vitamins-Minerals (MULTIVITAMIN WITH MINERALS) tablet tablet, Take 1 tablet by mouth Daily., Disp: , Rfl:     Omega-3 Fatty Acids (FISH OIL) 1200 MG capsule delayed-release, Take 1 capsule by mouth 2 (Two) Times a Day., Disp: , Rfl:     raNITIdine (ZANTAC) 300 MG tablet, Take 1 tablet by mouth Every Night., Disp: , Rfl:     Past Medical History:   Diagnosis Date    COPD (chronic obstructive pulmonary disease)     Hypertension     Thyroid disease        Past Surgical History:   Procedure Laterality Date    CATARACT EXTRACTION Bilateral     DILATATION AND CURETTAGE      x 2    ILEOSTOMY      REFRACTIVE SURGERY Bilateral        Social History     Socioeconomic History    Marital status:    Tobacco Use    Smoking status: Never    Smokeless tobacco: Never   Substance and Sexual Activity    Alcohol use: No    Drug use: No    Sexual activity: Defer       Family History   Problem Relation Age of Onset    Bipolar disorder Mother     Heart attack Father     Sick sinus syndrome Sister     Heart disease Brother     Sick sinus syndrome Brother        Objective    Temp 97.8 °F (36.6 °C)   Ht 160 cm (62.99\")   Wt 61.7 kg (136 lb)   BMI 24.10 kg/m²     Physical Exam  Nursing note reviewed.   Constitutional:       General: She is not in acute distress.     Appearance: Normal appearance. She is not ill-appearing.   HENT:      Nose: No congestion.   Abdominal:      Tenderness: There is no right " CVA tenderness or left CVA tenderness.      Hernia: No hernia is present.   Skin:     General: Skin is warm and dry.   Neurological:      Mental Status: She is alert and oriented to person, place, and time.   Psychiatric:         Mood and Affect: Mood normal.         Behavior: Behavior normal.             Results for orders placed or performed in visit on 01/30/24   POC Urinalysis Dipstick, Multipro    Specimen: Urine   Result Value Ref Range    Color Emilie Yellow, Straw, Dark Yellow, Emilie    Clarity, UA Clear Clear    Glucose, UA Negative Negative mg/dL    Bilirubin Negative Negative    Ketones, UA Negative Negative    Specific Gravity  1.030 1.005 - 1.030    Blood, UA Negative Negative    pH, Urine 5.0 5.0 - 8.0    Protein, POC Negative Negative mg/dL    Urobilinogen, UA 0.2 E.U./dL Normal, 0.2 E.U./dL    Nitrite, UA Negative Negative    Leukocytes Negative Negative     Assessment and Plan    Diagnoses and all orders for this visit:    1. Recurrent UTI (Primary)  -     POC Urinalysis Dipstick, Multipro      Finishing up the 3-month course low-dose Keflex prophylaxis.  0 breakthrough infections reported.  Has remained on vaginal estrogen cream as well as over-the-counter probiotic.  Will hold off on any further antibiotic prophylaxis.  Will continue twice weekly vaginal estrogen cream as well as daily over-the-counter cranberry and probiotic.  Again have encouraged increased fluid intake and follow-up in 6 months

## 2024-01-30 ENCOUNTER — OFFICE VISIT (OUTPATIENT)
Dept: UROLOGY | Facility: CLINIC | Age: 79
End: 2024-01-30
Payer: MEDICARE

## 2024-01-30 VITALS — TEMPERATURE: 97.8 F | HEIGHT: 63 IN | WEIGHT: 136 LBS | BODY MASS INDEX: 24.1 KG/M2

## 2024-01-30 DIAGNOSIS — N39.0 RECURRENT UTI: Primary | ICD-10-CM

## 2024-01-30 LAB
BILIRUB BLD-MCNC: NEGATIVE MG/DL
CLARITY, POC: CLEAR
COLOR UR: NORMAL
GLUCOSE UR STRIP-MCNC: NEGATIVE MG/DL
KETONES UR QL: NEGATIVE
LEUKOCYTE EST, POC: NEGATIVE
NITRITE UR-MCNC: NEGATIVE MG/ML
PH UR: 5 [PH] (ref 5–8)
PROT UR STRIP-MCNC: NEGATIVE MG/DL
RBC # UR STRIP: NEGATIVE /UL
SP GR UR: 1.03 (ref 1–1.03)
UROBILINOGEN UR QL: NORMAL

## 2024-02-14 ENCOUNTER — OFFICE VISIT (OUTPATIENT)
Dept: UROLOGY | Facility: CLINIC | Age: 79
End: 2024-02-14
Payer: MEDICARE

## 2024-02-14 VITALS — TEMPERATURE: 97.4 F | WEIGHT: 136 LBS | HEIGHT: 63 IN | BODY MASS INDEX: 24.1 KG/M2

## 2024-02-14 DIAGNOSIS — N39.0 RECURRENT UTI: Primary | ICD-10-CM

## 2024-02-14 RX ORDER — CEFDINIR 300 MG/1
300 CAPSULE ORAL 2 TIMES DAILY
Qty: 20 CAPSULE | Refills: 0 | Status: SHIPPED | OUTPATIENT
Start: 2024-02-14

## 2024-02-14 RX ORDER — PHENAZOPYRIDINE HYDROCHLORIDE 100 MG/1
1 TABLET, FILM COATED ORAL EVERY 12 HOURS SCHEDULED
COMMUNITY
Start: 2023-10-22

## 2024-02-19 ENCOUNTER — TELEPHONE (OUTPATIENT)
Dept: UROLOGY | Facility: CLINIC | Age: 79
End: 2024-02-19
Payer: MEDICARE

## 2024-02-19 NOTE — TELEPHONE ENCOUNTER
----- Message from LORA Hudson sent at 2/19/2024 10:54 AM CST -----  Urine culture positive for Klebsiella bacteria.  Continue previously prescribed antibiotic

## 2024-02-19 NOTE — TELEPHONE ENCOUNTER
Left patient a voicemail to let her know  Urine culture positive for Klebsiella bacteria.  Continue previously prescribed antibiotic     HUB can read

## 2024-03-05 ENCOUNTER — TELEPHONE (OUTPATIENT)
Dept: UROLOGY | Facility: CLINIC | Age: 79
End: 2024-03-05
Payer: MEDICARE

## 2024-03-05 NOTE — TELEPHONE ENCOUNTER
Provider: VERONICA GIRON    Caller: CHRISSY NIEVES    Relationship to Patient: SELF    Pharmacy: [facility]    Phone Number: [phone]    Reason for Call: PT HAS COMPLETED LAST ANTIBIOTIC - CEFDINIR ABOUT 1 WEEK AGO.      PT IS STARTING TO HAVE PRESSURE AGAIN, AND BURNING.  PT THINKS UTI HAS RETURNED.    PT HAS ALSO BEEN ON A ZPACK-AMOX CALAV 500/125 MG FOR A SINUS INFECTION, WHICH HAS BEEN COMPLETED.    PLEASE REVIEW AND CALL PT IN ANOTHER MEDICATION TO TREAT UTI.    When was the patient last seen: 2/14/24    When did it start: 03/05/24      PLEASE CALL PT TO CONFIRM

## 2024-03-05 NOTE — PROGRESS NOTES
Subjective    Ms. Bauer is 79 y.o. female    Chief Complaint: uti symptoms     History of Present Illness    79-year-old female established patient presents with return of urinary tract infection symptoms suprapubic pressure and burning with urination accompanied with increased frequency.  Patient last treated with cefdinir 2/14/2024 due to Klebsiella positive urine culture.  Patient with an extensive history of recurrent urinary tract infections previously treated with prophylactic course of Keflex.  Has remained on vaginal estrogen cream and is using 2-3 nights weekly along with over-the-counter cranberry.  No recent upper tract imaging.    The following portions of the patient's history were reviewed and updated as appropriate: allergies, current medications, past family history, past medical history, past social history, past surgical history and problem list.    Review of Systems   Constitutional:  Negative for chills and fever.   Gastrointestinal:  Negative for abdominal pain, anal bleeding, blood in stool, nausea and vomiting.   Genitourinary:  Positive for dysuria and urgency. Negative for frequency and hematuria.         Current Outpatient Medications:     amitriptyline (ELAVIL) 10 MG tablet, Take 1 tablet by mouth Daily. Take 2 tablets daily, Disp: , Rfl:     amLODIPine (NORVASC) 5 MG tablet, Take 1 tablet by mouth Daily., Disp: , Rfl:     busPIRone (BUSPAR) 10 MG tablet, Take 1 tablet by mouth 2 (Two) Times a Day., Disp: , Rfl:     Calcium Carb-Cholecalciferol (CALCIUM 600 + D PO), Take 1 tablet by mouth Daily., Disp: , Rfl:     cefdinir (OMNICEF) 300 MG capsule, Take 1 capsule by mouth 2 (Two) Times a Day., Disp: 20 capsule, Rfl: 0    citalopram (CeleXA) 40 MG tablet, Take 1 tablet by mouth Daily., Disp: , Rfl:     clonazePAM (KlonoPIN) 1 MG tablet, Take 1 tablet by mouth At Night As Needed for Seizures., Disp: , Rfl:     dexlansoprazole (DEXILANT) 60 MG capsule, Take 1 capsule by mouth Daily., Disp: ,  Rfl:     escitalopram (LEXAPRO) 20 MG tablet, Take 1 tablet by mouth Every Morning., Disp: , Rfl:     furosemide (LASIX) 20 MG tablet, Take 1 tablet by mouth As Needed., Disp: , Rfl:     HYDROcodone-acetaminophen (NORCO) 5-325 MG per tablet, Take 1 tablet by mouth Every 4 (Four) Hours As Needed for Moderate Pain., Disp: 15 tablet, Rfl: 0    levothyroxine (SYNTHROID, LEVOTHROID) 50 MCG tablet, Take 1 tablet by mouth Daily., Disp: , Rfl:     loratadine (CLARITIN) 10 MG tablet, Take 1 tablet by mouth Daily., Disp: , Rfl:     meloxicam (MOBIC) 15 MG tablet, Take 1 tablet by mouth Daily., Disp: , Rfl:     metoprolol tartrate (LOPRESSOR) 25 MG tablet, Take 1 tablet by mouth 2 (Two) Times a Day., Disp: , Rfl:     Multiple Vitamins-Minerals (MULTIVITAMIN WITH MINERALS) tablet tablet, Take 1 tablet by mouth Daily., Disp: , Rfl:     Omega-3 Fatty Acids (FISH OIL) 1200 MG capsule delayed-release, Take 1 capsule by mouth 2 (Two) Times a Day., Disp: , Rfl:     phenazopyridine (PYRIDIUM) 100 MG tablet, Take 1 tablet by mouth Every 12 (Twelve) Hours., Disp: , Rfl:     raNITIdine (ZANTAC) 300 MG tablet, Take 1 tablet by mouth Every Night., Disp: , Rfl:     sulfamethoxazole-trimethoprim (Bactrim DS) 800-160 MG per tablet, Take 1 tablet by mouth 2 (Two) Times a Day., Disp: 20 tablet, Rfl: 0    Past Medical History:   Diagnosis Date    COPD (chronic obstructive pulmonary disease)     Hypertension     Thyroid disease        Past Surgical History:   Procedure Laterality Date    CATARACT EXTRACTION Bilateral     DILATATION AND CURETTAGE      x 2    ILEOSTOMY      REFRACTIVE SURGERY Bilateral        Social History     Socioeconomic History    Marital status:    Tobacco Use    Smoking status: Never    Smokeless tobacco: Never   Substance and Sexual Activity    Alcohol use: No    Drug use: No    Sexual activity: Defer       Family History   Problem Relation Age of Onset    Bipolar disorder Mother     Heart attack Father     Sick  "sinus syndrome Sister     Heart disease Brother     Sick sinus syndrome Brother        Objective    Temp 97.5 °F (36.4 °C)   Ht 160 cm (62.99\")   Wt 61.7 kg (136 lb)   BMI 24.10 kg/m²     Physical Exam  Nursing note reviewed.   Constitutional:       General: She is not in acute distress.     Appearance: Normal appearance. She is not ill-appearing.   HENT:      Nose: No congestion.   Abdominal:      Tenderness: There is no right CVA tenderness or left CVA tenderness.      Hernia: No hernia is present.   Skin:     General: Skin is warm and dry.   Neurological:      Mental Status: She is alert and oriented to person, place, and time.   Psychiatric:         Mood and Affect: Mood normal.         Behavior: Behavior normal.             Results for orders placed or performed in visit on 03/06/24   POC Urinalysis Dipstick, Multipro    Specimen: Urine   Result Value Ref Range    Color Yellow Yellow, Straw, Dark Yellow, Emilie    Clarity, UA Slightly Cloudy (A) Clear    Glucose, UA Negative Negative mg/dL    Bilirubin Negative Negative    Ketones, UA Negative Negative    Specific Gravity  1.030 1.005 - 1.030    Blood, UA Large (A) Negative    pH, Urine 5.5 5.0 - 8.0    Protein,  mg/dL (A) Negative mg/dL    Urobilinogen, UA 0.2 E.U./dL Normal, 0.2 E.U./dL    Nitrite, UA Negative Negative    Leukocytes Moderate (2+) (A) Negative     Assessment and Plan    Diagnoses and all orders for this visit:    1. Recurrent UTI (Primary)  -     POC Urinalysis Dipstick, Multipro  -     US Renal Bilateral; Future  -     Urine Culture - Urine, Urine, Random Void  -     sulfamethoxazole-trimethoprim (Bactrim DS) 800-160 MG per tablet; Take 1 tablet by mouth 2 (Two) Times a Day.  Dispense: 20 tablet; Refill: 0        Return of UTI symptoms urine suspicious today for infection.  Bacteria noted under the microscope.  Will again send urine for culture.  Will go ahead and treat with Bactrim DS.  Given that patient continues to experience 1 " infection after another we will place orders for renal ultrasound.  Will likely benefit from starting back on prophylactic course in the near future.  Will have patient return in 2 weeks for follow-up and if urine looks good we will consider starting back on prophylaxis.

## 2024-03-05 NOTE — TELEPHONE ENCOUNTER
Patient called in wanting a antibiotic for her UTI.. I let her know we can not just send in a antibiotic without her being seen. I have made patient an appointment for next day. 3/6/2024.

## 2024-03-06 ENCOUNTER — OFFICE VISIT (OUTPATIENT)
Dept: UROLOGY | Facility: CLINIC | Age: 79
End: 2024-03-06
Payer: MEDICARE

## 2024-03-06 VITALS — WEIGHT: 136 LBS | TEMPERATURE: 97.5 F | HEIGHT: 63 IN | BODY MASS INDEX: 24.1 KG/M2

## 2024-03-06 DIAGNOSIS — N39.0 RECURRENT UTI: Primary | ICD-10-CM

## 2024-03-06 LAB
BILIRUB BLD-MCNC: NEGATIVE MG/DL
CLARITY, POC: ABNORMAL
COLOR UR: YELLOW
GLUCOSE UR STRIP-MCNC: NEGATIVE MG/DL
KETONES UR QL: NEGATIVE
LEUKOCYTE EST, POC: ABNORMAL
NITRITE UR-MCNC: NEGATIVE MG/ML
PH UR: 5.5 [PH] (ref 5–8)
PROT UR STRIP-MCNC: ABNORMAL MG/DL
RBC # UR STRIP: ABNORMAL /UL
SP GR UR: 1.03 (ref 1–1.03)
UROBILINOGEN UR QL: ABNORMAL

## 2024-03-06 PROCEDURE — 87086 URINE CULTURE/COLONY COUNT: CPT

## 2024-03-06 PROCEDURE — 87186 SC STD MICRODIL/AGAR DIL: CPT

## 2024-03-06 RX ORDER — SULFAMETHOXAZOLE AND TRIMETHOPRIM 800; 160 MG/1; MG/1
1 TABLET ORAL 2 TIMES DAILY
Qty: 20 TABLET | Refills: 0 | Status: SHIPPED | OUTPATIENT
Start: 2024-03-06

## 2024-03-08 ENCOUNTER — TELEPHONE (OUTPATIENT)
Dept: UROLOGY | Facility: CLINIC | Age: 79
End: 2024-03-08
Payer: MEDICARE

## 2024-03-08 LAB — BACTERIA SPEC AEROBE CULT: ABNORMAL

## 2024-03-08 NOTE — TELEPHONE ENCOUNTER
Spoke with patient to let her know   Culture positive for bacteria please continue on previously prescribed antibiotic     Patient verbalized understanding. She wanted to be transferred to scheduling to have her Ultrasound scheduled.

## 2024-03-08 NOTE — TELEPHONE ENCOUNTER
----- Message from LORA Hudson sent at 3/8/2024 11:50 AM CST -----  Culture positive for bacteria please continue on previously prescribed antibiotic

## 2024-03-11 NOTE — PROGRESS NOTES
Subjective    Ms. Bauer is 79 y.o. female    Chief Complaint: recurrent uti follow up     History of Present Illness    79-year-old female established patient in for urine recheck due to ongoing stock with recurrent infections.  Was last treated with Bactrim DS due to culture positive E. coli.  Previous history of prophylactic Keflex due to the infections.  Shortly after completing prophylactic course infections returned.  Has remained on vaginal estrogen cream 2-3 nights weekly along with over-the-counter cranberry and probiotic.  Recently underwent renal ultrasound imaging revealing 2 questionable stones within the left kidney.  Unable to fully evaluate the bladder due to under distention.        The following portions of the patient's history were reviewed and updated as appropriate: allergies, current medications, past family history, past medical history, past social history, past surgical history and problem list.    Review of Systems   Constitutional:  Negative for chills and fever.   Gastrointestinal:  Negative for abdominal pain, anal bleeding, blood in stool, nausea and vomiting.   Genitourinary:  Negative for difficulty urinating, dysuria, flank pain, frequency, hematuria, pelvic pain and urgency.         Current Outpatient Medications:     amitriptyline (ELAVIL) 10 MG tablet, Take 1 tablet by mouth Daily. Take 2 tablets daily, Disp: , Rfl:     amLODIPine (NORVASC) 5 MG tablet, Take 1 tablet by mouth Daily., Disp: , Rfl:     busPIRone (BUSPAR) 10 MG tablet, Take 1 tablet by mouth 2 (Two) Times a Day., Disp: , Rfl:     Calcium Carb-Cholecalciferol (CALCIUM 600 + D PO), Take 1 tablet by mouth Daily., Disp: , Rfl:     cefdinir (OMNICEF) 300 MG capsule, Take 1 capsule by mouth 2 (Two) Times a Day., Disp: 20 capsule, Rfl: 0    citalopram (CeleXA) 40 MG tablet, Take 1 tablet by mouth Daily., Disp: , Rfl:     clonazePAM (KlonoPIN) 1 MG tablet, Take 1 tablet by mouth At Night As Needed for Seizures., Disp: ,  Rfl:     dexlansoprazole (DEXILANT) 60 MG capsule, Take 1 capsule by mouth Daily., Disp: , Rfl:     escitalopram (LEXAPRO) 20 MG tablet, Take 1 tablet by mouth Every Morning., Disp: , Rfl:     furosemide (LASIX) 20 MG tablet, Take 1 tablet by mouth As Needed., Disp: , Rfl:     HYDROcodone-acetaminophen (NORCO) 5-325 MG per tablet, Take 1 tablet by mouth Every 4 (Four) Hours As Needed for Moderate Pain., Disp: 15 tablet, Rfl: 0    levothyroxine (SYNTHROID, LEVOTHROID) 50 MCG tablet, Take 1 tablet by mouth Daily., Disp: , Rfl:     loratadine (CLARITIN) 10 MG tablet, Take 1 tablet by mouth Daily., Disp: , Rfl:     meloxicam (MOBIC) 15 MG tablet, Take 1 tablet by mouth Daily., Disp: , Rfl:     metoprolol tartrate (LOPRESSOR) 25 MG tablet, Take 1 tablet by mouth 2 (Two) Times a Day., Disp: , Rfl:     Multiple Vitamins-Minerals (MULTIVITAMIN WITH MINERALS) tablet tablet, Take 1 tablet by mouth Daily., Disp: , Rfl:     Omega-3 Fatty Acids (FISH OIL) 1200 MG capsule delayed-release, Take 1 capsule by mouth 2 (Two) Times a Day., Disp: , Rfl:     phenazopyridine (PYRIDIUM) 100 MG tablet, Take 1 tablet by mouth Every 12 (Twelve) Hours., Disp: , Rfl:     raNITIdine (ZANTAC) 300 MG tablet, Take 1 tablet by mouth Every Night., Disp: , Rfl:     sulfamethoxazole-trimethoprim (Bactrim DS) 800-160 MG per tablet, Take 1 tablet by mouth 2 (Two) Times a Day., Disp: 20 tablet, Rfl: 0    nitrofurantoin (MACRODANTIN) 50 MG capsule, Take 1 capsule by mouth Every Night., Disp: 90 capsule, Rfl: 0    Past Medical History:   Diagnosis Date    COPD (chronic obstructive pulmonary disease)     Hypertension     Thyroid disease        Past Surgical History:   Procedure Laterality Date    CATARACT EXTRACTION Bilateral     DILATATION AND CURETTAGE      x 2    ILEOSTOMY      REFRACTIVE SURGERY Bilateral        Social History     Socioeconomic History    Marital status:    Tobacco Use    Smoking status: Never    Smokeless tobacco: Never  "  Substance and Sexual Activity    Alcohol use: No    Drug use: No    Sexual activity: Defer       Family History   Problem Relation Age of Onset    Bipolar disorder Mother     Heart attack Father     Sick sinus syndrome Sister     Heart disease Brother     Sick sinus syndrome Brother        Objective    Temp 97.1 °F (36.2 °C)   Ht 160 cm (62.99\")   Wt 61.7 kg (136 lb)   BMI 24.10 kg/m²     Physical Exam  Nursing note reviewed.   Constitutional:       General: She is not in acute distress.     Appearance: Normal appearance. She is not ill-appearing.   HENT:      Nose: No congestion.   Abdominal:      Tenderness: There is no right CVA tenderness or left CVA tenderness.      Hernia: No hernia is present.   Skin:     General: Skin is warm and dry.   Neurological:      Mental Status: She is alert and oriented to person, place, and time.   Psychiatric:         Mood and Affect: Mood normal.         Behavior: Behavior normal.             Results for orders placed or performed in visit on 03/21/24   POC Urinalysis Dipstick, Multipro    Specimen: Urine   Result Value Ref Range    Color Emilie Yellow, Straw, Dark Yellow, Emilie    Clarity, UA Clear Clear    Glucose, UA Negative Negative mg/dL    Bilirubin Negative Negative    Ketones, UA Negative Negative    Specific Gravity  1.030 1.005 - 1.030    Blood, UA Negative Negative    pH, Urine 5.5 5.0 - 8.0    Protein, POC Negative Negative mg/dL    Urobilinogen, UA 0.2 E.U./dL Normal, 0.2 E.U./dL    Nitrite, UA Negative Negative    Leukocytes Negative Negative     Assessment and Plan    Diagnoses and all orders for this visit:    1. Recurrent UTI (Primary)  -     POC Urinalysis Dipstick, Multipro  -     nitrofurantoin (MACRODANTIN) 50 MG capsule; Take 1 capsule by mouth Every Night.  Dispense: 90 capsule; Refill: 0      Based on urinalysis today infection appears to be gone with this being said recommend starting back on a prophylactic course of antibiotic given that patient " continues to stock such frequent infections with symptoms.  Will start on nitrofurantoin 50 mg nightly    Follow-up 3 months    Will continue vaginal estrogen cream as well as OTC cranberry and probiotic

## 2024-03-14 ENCOUNTER — HOSPITAL ENCOUNTER (OUTPATIENT)
Dept: ULTRASOUND IMAGING | Facility: HOSPITAL | Age: 79
Discharge: HOME | End: 2024-03-14
Payer: MEDICARE

## 2024-03-14 DIAGNOSIS — N39.0 RECURRENT UTI: ICD-10-CM

## 2024-03-14 PROCEDURE — 76775 US EXAM ABDO BACK WALL LIM: CPT

## 2024-03-15 ENCOUNTER — TELEPHONE (OUTPATIENT)
Dept: UROLOGY | Facility: CLINIC | Age: 79
End: 2024-03-15
Payer: MEDICARE

## 2024-03-15 NOTE — TELEPHONE ENCOUNTER
----- Message from LORA Hudson sent at 3/15/2024 11:26 AM CDT -----  Unable to evaluate the bladder due to being compressed.  Patient with a possible nonobstructing stone x 2 left kidney

## 2024-03-15 NOTE — TELEPHONE ENCOUNTER
Spoke with patient to let her know her US   Unable to evaluate the bladder due to being compressed. Patient with a possible nonobstructing stone x 2 left kidney     Patient verbalized understanding.

## 2024-03-15 NOTE — PROGRESS NOTES
Unable to evaluate the bladder due to being compressed.  Patient with a possible nonobstructing stone x 2 left kidney

## 2024-03-21 ENCOUNTER — OFFICE VISIT (OUTPATIENT)
Dept: UROLOGY | Facility: CLINIC | Age: 79
End: 2024-03-21
Payer: MEDICARE

## 2024-03-21 VITALS — HEIGHT: 63 IN | BODY MASS INDEX: 24.1 KG/M2 | WEIGHT: 136 LBS | TEMPERATURE: 97.1 F

## 2024-03-21 DIAGNOSIS — N39.0 RECURRENT UTI: Primary | ICD-10-CM

## 2024-03-21 LAB
BILIRUB BLD-MCNC: NEGATIVE MG/DL
CLARITY, POC: CLEAR
COLOR UR: NORMAL
GLUCOSE UR STRIP-MCNC: NEGATIVE MG/DL
KETONES UR QL: NEGATIVE
LEUKOCYTE EST, POC: NEGATIVE
NITRITE UR-MCNC: NEGATIVE MG/ML
PH UR: 5.5 [PH] (ref 5–8)
PROT UR STRIP-MCNC: NEGATIVE MG/DL
RBC # UR STRIP: NEGATIVE /UL
SP GR UR: 1.03 (ref 1–1.03)
UROBILINOGEN UR QL: NORMAL

## 2024-03-21 RX ORDER — NITROFURANTOIN MACROCRYSTALS 50 MG/1
50 CAPSULE ORAL NIGHTLY
Qty: 90 CAPSULE | Refills: 0 | Status: SHIPPED | OUTPATIENT
Start: 2024-03-21

## 2024-06-07 NOTE — PROGRESS NOTES
Subjective    Ms. Bauer is 79 y.o. female    Chief Complaint: Follow-up recurrent UTI    History of Present Illness    79-year-old female established patient in for follow-up due to ongoing stock with recurrent urinary tract infections.  Previous history of prophylactic Keflex due to the infections. Shortly after completing prophylactic course infections returned. Has remained on vaginal estrogen cream 2-3 nights weekly along with over-the-counter cranberry and probiotic. Recently underwent renal ultrasound imaging revealing 2 questionable stones within the left kidney. Unable to fully evaluate the bladder due to under distention.  Last visit was started on prophylactic nitrofurantoin.  0 reported infections since.  Currently asymptomatic.    The following portions of the patient's history were reviewed and updated as appropriate: allergies, current medications, past family history, past medical history, past social history, past surgical history and problem list.    Review of Systems   Constitutional:  Negative for chills and fever.   Gastrointestinal:  Negative for abdominal pain, anal bleeding and blood in stool.   Genitourinary:  Negative for dysuria and hematuria.         Current Outpatient Medications:     amLODIPine (NORVASC) 5 MG tablet, Take 1 tablet by mouth Daily., Disp: , Rfl:     busPIRone (BUSPAR) 10 MG tablet, Take 1 tablet by mouth 2 (Two) Times a Day., Disp: , Rfl:     Calcium Carb-Cholecalciferol (CALCIUM 600 + D PO), Take 1 tablet by mouth Daily., Disp: , Rfl:     citalopram (CeleXA) 40 MG tablet, Take 1 tablet by mouth Daily., Disp: , Rfl:     clonazePAM (KlonoPIN) 1 MG tablet, Take 1 tablet by mouth At Night As Needed for Seizures., Disp: , Rfl:     dexlansoprazole (DEXILANT) 60 MG capsule, Take 1 capsule by mouth Daily., Disp: , Rfl:     DULoxetine (CYMBALTA) 60 MG capsule, , Disp: , Rfl:     escitalopram (LEXAPRO) 20 MG tablet, Take 1 tablet by mouth Every Morning., Disp: , Rfl:      furosemide (LASIX) 20 MG tablet, Take 1 tablet by mouth As Needed., Disp: , Rfl:     HYDROcodone-acetaminophen (NORCO) 5-325 MG per tablet, Take 1 tablet by mouth Every 4 (Four) Hours As Needed for Moderate Pain., Disp: 15 tablet, Rfl: 0    levothyroxine (SYNTHROID, LEVOTHROID) 50 MCG tablet, Take 1 tablet by mouth Daily., Disp: , Rfl:     loratadine (CLARITIN) 10 MG tablet, Take 1 tablet by mouth Daily., Disp: , Rfl:     meloxicam (MOBIC) 15 MG tablet, Take 1 tablet by mouth Daily., Disp: , Rfl:     metoprolol tartrate (LOPRESSOR) 25 MG tablet, Take 1 tablet by mouth 2 (Two) Times a Day., Disp: , Rfl:     Multiple Vitamins-Minerals (MULTIVITAMIN WITH MINERALS) tablet tablet, Take 1 tablet by mouth Daily., Disp: , Rfl:     nitrofurantoin (MACRODANTIN) 50 MG capsule, Take 1 capsule by mouth Every Night., Disp: 90 capsule, Rfl: 0    Omega-3 Fatty Acids (FISH OIL) 1200 MG capsule delayed-release, Take 1 capsule by mouth 2 (Two) Times a Day., Disp: , Rfl:     phenazopyridine (PYRIDIUM) 100 MG tablet, Take 1 tablet by mouth Every 12 (Twelve) Hours., Disp: , Rfl:     raNITIdine (ZANTAC) 300 MG tablet, Take 1 tablet by mouth Every Night., Disp: , Rfl:     amitriptyline (ELAVIL) 10 MG tablet, Take 1 tablet by mouth Daily. Take 2 tablets daily (Patient not taking: Reported on 6/18/2024), Disp: , Rfl:     cefdinir (OMNICEF) 300 MG capsule, Take 1 capsule by mouth 2 (Two) Times a Day. (Patient not taking: Reported on 6/18/2024), Disp: 20 capsule, Rfl: 0    sulfamethoxazole-trimethoprim (Bactrim DS) 800-160 MG per tablet, Take 1 tablet by mouth 2 (Two) Times a Day. (Patient not taking: Reported on 6/18/2024), Disp: 20 tablet, Rfl: 0    Past Medical History:   Diagnosis Date    COPD (chronic obstructive pulmonary disease)     Hypertension     Thyroid disease        Past Surgical History:   Procedure Laterality Date    CATARACT EXTRACTION Bilateral     DILATATION AND CURETTAGE      x 2    ILEOSTOMY      REFRACTIVE SURGERY  "Bilateral        Social History     Socioeconomic History    Marital status:    Tobacco Use    Smoking status: Never     Passive exposure: Never    Smokeless tobacco: Never   Vaping Use    Vaping status: Never Used   Substance and Sexual Activity    Alcohol use: No    Drug use: No    Sexual activity: Defer       Family History   Problem Relation Age of Onset    Bipolar disorder Mother     Heart attack Father     Sick sinus syndrome Sister     Heart disease Brother     Sick sinus syndrome Brother        Objective    Temp 97.4 °F (36.3 °C)   Ht 160 cm (62.99\")   Wt 61.7 kg (136 lb)   BMI 24.10 kg/m²     Physical Exam  Nursing note reviewed.   Constitutional:       General: She is not in acute distress.     Appearance: Normal appearance. She is not ill-appearing.   HENT:      Nose: No congestion.   Abdominal:      Tenderness: There is no right CVA tenderness or left CVA tenderness.      Hernia: No hernia is present.   Skin:     General: Skin is warm and dry.   Neurological:      Mental Status: She is alert and oriented to person, place, and time.   Psychiatric:         Mood and Affect: Mood normal.         Behavior: Behavior normal.             Results for orders placed or performed in visit on 06/18/24   POC Urinalysis Dipstick, Multipro    Specimen: Urine   Result Value Ref Range    Color Yellow Yellow, Straw, Dark Yellow, Emilie    Clarity, UA Clear Clear    Glucose, UA Negative Negative mg/dL    Bilirubin Negative Negative    Ketones, UA Trace (A) Negative    Specific Gravity  1.030 1.005 - 1.030    Blood, UA Negative Negative    pH, Urine 5.5 5.0 - 8.0    Protein, POC Negative Negative mg/dL    Urobilinogen, UA 0.2 E.U./dL Normal, 0.2 E.U./dL    Nitrite, UA Negative Negative    Leukocytes Trace (A) Negative     Assessment and Plan    Diagnoses and all orders for this visit:    1. Recurrent UTI (Primary)  -     POC Urinalysis Dipstick, Multipro  -     nitrofurantoin (MACRODANTIN) 50 MG capsule; Take 1 " capsule by mouth Every Night.  Dispense: 90 capsule; Refill: 0      Completed 3 months nitrofurantoin prophylaxis.  0 reported infections during.  Currently asymptomatic.  Would like to continue for an additional 3 months.  Will send in a new prescription.  Urinalysis good today.    Follow-up 3 months    Continue vaginal estrogen cream 2-3 nights weekly along with OTC cranberry and probiotic

## 2024-06-18 ENCOUNTER — OFFICE VISIT (OUTPATIENT)
Dept: UROLOGY | Facility: CLINIC | Age: 79
End: 2024-06-18
Payer: MEDICARE

## 2024-06-18 VITALS — BODY MASS INDEX: 24.1 KG/M2 | HEIGHT: 63 IN | TEMPERATURE: 97.4 F | WEIGHT: 136 LBS

## 2024-06-18 DIAGNOSIS — N39.0 RECURRENT UTI: Primary | ICD-10-CM

## 2024-06-18 LAB
BILIRUB BLD-MCNC: NEGATIVE MG/DL
CLARITY, POC: CLEAR
COLOR UR: YELLOW
GLUCOSE UR STRIP-MCNC: NEGATIVE MG/DL
KETONES UR QL: ABNORMAL
LEUKOCYTE EST, POC: ABNORMAL
NITRITE UR-MCNC: NEGATIVE MG/ML
PH UR: 5.5 [PH] (ref 5–8)
PROT UR STRIP-MCNC: NEGATIVE MG/DL
RBC # UR STRIP: NEGATIVE /UL
SP GR UR: 1.03 (ref 1–1.03)
UROBILINOGEN UR QL: ABNORMAL

## 2024-06-18 RX ORDER — DULOXETIN HYDROCHLORIDE 60 MG/1
CAPSULE, DELAYED RELEASE ORAL
COMMUNITY
Start: 2024-06-17

## 2024-06-18 RX ORDER — NITROFURANTOIN MACROCRYSTALS 50 MG/1
50 CAPSULE ORAL NIGHTLY
Qty: 90 CAPSULE | Refills: 0 | Status: SHIPPED | OUTPATIENT
Start: 2024-06-18

## 2024-09-17 ENCOUNTER — OFFICE VISIT (OUTPATIENT)
Dept: UROLOGY | Facility: CLINIC | Age: 79
End: 2024-09-17
Payer: MEDICARE

## 2024-09-17 VITALS — HEIGHT: 63 IN | WEIGHT: 136 LBS | TEMPERATURE: 97.5 F | BODY MASS INDEX: 24.1 KG/M2

## 2024-09-17 DIAGNOSIS — N39.0 RECURRENT UTI: Primary | ICD-10-CM

## 2024-09-17 PROCEDURE — 99214 OFFICE O/P EST MOD 30 MIN: CPT

## 2024-09-17 PROCEDURE — 81001 URINALYSIS AUTO W/SCOPE: CPT

## 2024-09-17 PROCEDURE — 1159F MED LIST DOCD IN RCRD: CPT

## 2024-09-17 PROCEDURE — 1160F RVW MEDS BY RX/DR IN RCRD: CPT

## 2024-09-17 RX ORDER — ASPIRIN 81 MG/1
81 TABLET ORAL DAILY
COMMUNITY

## 2024-09-17 RX ORDER — ATORVASTATIN CALCIUM 20 MG/1
20 TABLET, FILM COATED ORAL DAILY
COMMUNITY
Start: 2024-09-16

## 2024-10-31 ENCOUNTER — OFFICE VISIT (OUTPATIENT)
Dept: UROLOGY | Facility: CLINIC | Age: 79
End: 2024-10-31
Payer: MEDICARE

## 2024-10-31 VITALS — HEIGHT: 63 IN | WEIGHT: 138 LBS | TEMPERATURE: 98 F | BODY MASS INDEX: 24.45 KG/M2

## 2024-10-31 DIAGNOSIS — N39.0 RECURRENT UTI: Primary | ICD-10-CM

## 2024-10-31 LAB
BILIRUB BLD-MCNC: NEGATIVE MG/DL
CLARITY, POC: ABNORMAL
COLOR UR: ABNORMAL
GLUCOSE UR STRIP-MCNC: NEGATIVE MG/DL
KETONES UR QL: NEGATIVE
LEUKOCYTE EST, POC: ABNORMAL
NITRITE UR-MCNC: POSITIVE MG/ML
PH UR: 5.5 [PH] (ref 5–8)
PROT UR STRIP-MCNC: ABNORMAL MG/DL
RBC # UR STRIP: ABNORMAL /UL
SP GR UR: 1.02 (ref 1–1.03)
UROBILINOGEN UR QL: ABNORMAL

## 2024-10-31 PROCEDURE — 87186 SC STD MICRODIL/AGAR DIL: CPT

## 2024-10-31 PROCEDURE — 87086 URINE CULTURE/COLONY COUNT: CPT

## 2024-10-31 PROCEDURE — 87077 CULTURE AEROBIC IDENTIFY: CPT

## 2024-10-31 RX ORDER — FLUCONAZOLE 150 MG/1
150 TABLET ORAL DAILY
Qty: 2 TABLET | Refills: 0 | Status: SHIPPED | OUTPATIENT
Start: 2024-10-31

## 2024-10-31 RX ORDER — ESTRADIOL 0.1 MG/G
CREAM VAGINAL
COMMUNITY
Start: 2024-10-26

## 2024-10-31 RX ORDER — CEFDINIR 300 MG/1
300 CAPSULE ORAL 2 TIMES DAILY
Qty: 20 CAPSULE | Refills: 0 | Status: SHIPPED | OUTPATIENT
Start: 2024-10-31

## 2024-10-31 RX ORDER — AMLODIPINE BESYLATE 10 MG/1
TABLET ORAL
COMMUNITY
Start: 2024-09-27

## 2024-10-31 RX ORDER — PHENAZOPYRIDINE HYDROCHLORIDE 100 MG/1
100 TABLET, FILM COATED ORAL EVERY 12 HOURS SCHEDULED
Qty: 20 TABLET | Refills: 0 | Status: SHIPPED | OUTPATIENT
Start: 2024-10-31

## 2024-10-31 NOTE — PROGRESS NOTES
Subjective    Ms. Bauer is 79 y.o. female    Chief Complaint:reTurn of UTI symptoms    History of Present Illness    79-year-old female established patient seen for history of chronic cystitis last seen 1 month ago.  Reporting return of UTI symptoms suprapubic pain, urine frequency, urgency, burning with urination.  Had been on nitrofurantoin prophylaxis that was stopped last month as she had completed 6 full months this round.  Has remained on over-the-counter supplementation cranberry and probiotic.  Along with prescription vaginal estrogen cream 3 nights weekly.  Currently requiring Pyridium usage due to discomfort.     renal ultrasound imaging  2 stones within the left kidney.     The following portions of the patient's history were reviewed and updated as appropriate: allergies, current medications, past family history, past medical history, past social history, past surgical history and problem list.    Review of Systems   Constitutional:  Negative for chills, fatigue and fever.   Gastrointestinal:  Negative for nausea and vomiting.   Genitourinary:  Positive for dysuria, frequency, pelvic pain and urgency.         Current Outpatient Medications:     amitriptyline (ELAVIL) 25 MG tablet, Take 1 tablet by mouth every night at bedtime., Disp: , Rfl:     amLODIPine (NORVASC) 10 MG tablet, , Disp: , Rfl:     aspirin 81 MG EC tablet, Take 1 tablet by mouth Daily., Disp: , Rfl:     atorvastatin (LIPITOR) 20 MG tablet, Take 1 tablet by mouth Daily., Disp: , Rfl:     busPIRone (BUSPAR) 10 MG tablet, Take 1 tablet by mouth 2 (Two) Times a Day., Disp: , Rfl:     Calcium Carb-Cholecalciferol (CALCIUM 600 + D PO), Take 1 tablet by mouth Daily., Disp: , Rfl:     citalopram (CeleXA) 40 MG tablet, Take 1 tablet by mouth Daily., Disp: , Rfl:     clonazePAM (KlonoPIN) 1 MG tablet, Take 1 tablet by mouth At Night As Needed for Seizures., Disp: , Rfl:     dexlansoprazole (DEXILANT) 60 MG capsule, Take 1 capsule by mouth Daily.,  Disp: , Rfl:     DULoxetine (CYMBALTA) 60 MG capsule, , Disp: , Rfl:     escitalopram (LEXAPRO) 20 MG tablet, Take 1 tablet by mouth Every Morning., Disp: , Rfl:     estradiol (ESTRACE) 0.1 MG/GM vaginal cream, , Disp: , Rfl:     furosemide (LASIX) 20 MG tablet, Take 1 tablet by mouth As Needed., Disp: , Rfl:     HYDROcodone-acetaminophen (NORCO) 5-325 MG per tablet, Take 1 tablet by mouth Every 4 (Four) Hours As Needed for Moderate Pain., Disp: 15 tablet, Rfl: 0    levothyroxine (SYNTHROID, LEVOTHROID) 50 MCG tablet, Take 1 tablet by mouth Daily., Disp: , Rfl:     loratadine (CLARITIN) 10 MG tablet, Take 1 tablet by mouth Daily., Disp: , Rfl:     meloxicam (MOBIC) 15 MG tablet, Take 1 tablet by mouth Daily., Disp: , Rfl:     metoprolol tartrate (LOPRESSOR) 25 MG tablet, Take 1 tablet by mouth 2 (Two) Times a Day., Disp: , Rfl:     Multiple Vitamins-Minerals (MULTIVITAMIN WITH MINERALS) tablet tablet, Take 1 tablet by mouth Daily., Disp: , Rfl:     Omega-3 Fatty Acids (FISH OIL) 1200 MG capsule delayed-release, Take 1 capsule by mouth 2 (Two) Times a Day., Disp: , Rfl:     phenazopyridine (PYRIDIUM) 100 MG tablet, Take 1 tablet by mouth Every 12 (Twelve) Hours., Disp: 20 tablet, Rfl: 0    raNITIdine (ZANTAC) 300 MG tablet, Take 1 tablet by mouth Every Night., Disp: , Rfl:     amitriptyline (ELAVIL) 10 MG tablet, Take 1 tablet by mouth Daily. Take 2 tablets daily (Patient not taking: Reported on 10/31/2024), Disp: , Rfl:     amLODIPine (NORVASC) 5 MG tablet, Take 1 tablet by mouth Daily. (Patient not taking: Reported on 10/31/2024), Disp: , Rfl:     cefdinir (OMNICEF) 300 MG capsule, Take 1 capsule by mouth 2 (Two) Times a Day., Disp: 20 capsule, Rfl: 0    fluconazole (DIFLUCAN) 150 MG tablet, Take 1 tablet by mouth Daily., Disp: 2 tablet, Rfl: 0    nitrofurantoin (MACRODANTIN) 50 MG capsule, Take 1 capsule by mouth Every Night. (Patient not taking: Reported on 10/31/2024), Disp: 90 capsule, Rfl: 0     "sulfamethoxazole-trimethoprim (Bactrim DS) 800-160 MG per tablet, Take 1 tablet by mouth 2 (Two) Times a Day. (Patient not taking: Reported on 10/31/2024), Disp: 20 tablet, Rfl: 0    Past Medical History:   Diagnosis Date    COPD (chronic obstructive pulmonary disease)     Hypertension     Thyroid disease        Past Surgical History:   Procedure Laterality Date    CATARACT EXTRACTION Bilateral     DILATATION AND CURETTAGE      x 2    ILEOSTOMY      REFRACTIVE SURGERY Bilateral        Social History     Socioeconomic History    Marital status:    Tobacco Use    Smoking status: Never     Passive exposure: Never    Smokeless tobacco: Never   Vaping Use    Vaping status: Never Used   Substance and Sexual Activity    Alcohol use: No    Drug use: No    Sexual activity: Defer       Family History   Problem Relation Age of Onset    Bipolar disorder Mother     Heart attack Father     Sick sinus syndrome Sister     Heart disease Brother     Sick sinus syndrome Brother        Objective    Temp 98 °F (36.7 °C) (Infrared)   Ht 160 cm (62.99\")   Wt 62.6 kg (138 lb)   BMI 24.45 kg/m²     Physical Exam  Nursing note reviewed.   Constitutional:       General: She is not in acute distress.     Appearance: Normal appearance. She is not ill-appearing.   HENT:      Nose: No congestion.   Abdominal:      Tenderness: There is no right CVA tenderness or left CVA tenderness.      Hernia: No hernia is present.   Skin:     General: Skin is warm and dry.   Neurological:      Mental Status: She is alert and oriented to person, place, and time.   Psychiatric:         Mood and Affect: Mood normal.         Behavior: Behavior normal.             Results for orders placed or performed in visit on 10/31/24   POC Urinalysis Dipstick, Multipro    Collection Time: 10/31/24 10:23 AM    Specimen: Urine   Result Value Ref Range    Color Orange (A) Yellow, Straw, Dark Yellow, Emilie    Clarity, UA Cloudy (A) Clear    Glucose, UA Negative Negative " mg/dL    Bilirubin Negative Negative    Ketones, UA Negative Negative    Specific Gravity  1.025 1.005 - 1.030    Blood, UA Trace (A) Negative    pH, Urine 5.5 5.0 - 8.0    Protein,  mg/dL (A) Negative mg/dL    Urobilinogen, UA 0.2 E.U./dL Normal, 0.2 E.U./dL    Nitrite, UA Positive (A) Negative    Leukocytes Small (1+) (A) Negative     Assessment and Plan    Diagnoses and all orders for this visit:    1. Recurrent UTI (Primary)  -     POC Urinalysis Dipstick, Multipro  -     Urine Culture - , Urine, Random Void  -     phenazopyridine (PYRIDIUM) 100 MG tablet; Take 1 tablet by mouth Every 12 (Twelve) Hours.  Dispense: 20 tablet; Refill: 0  -     fluconazole (DIFLUCAN) 150 MG tablet; Take 1 tablet by mouth Daily.  Dispense: 2 tablet; Refill: 0  -     cefdinir (OMNICEF) 300 MG capsule; Take 1 capsule by mouth 2 (Two) Times a Day.  Dispense: 20 capsule; Refill: 0      Urinalysis today is abnormal-likely due to Pyridium usage therefore unable to fully evaluate.  Will go ahead and send urine for culture today and start patient on antibiotic based off of most recent culture.  Will contact patient if antibiotic needing changed once culture returns.    Keep follow-up with me in December

## 2024-11-02 LAB — BACTERIA SPEC AEROBE CULT: ABNORMAL

## 2024-11-04 ENCOUNTER — TELEPHONE (OUTPATIENT)
Dept: UROLOGY | Facility: CLINIC | Age: 79
End: 2024-11-04
Payer: MEDICARE

## 2024-11-04 NOTE — TELEPHONE ENCOUNTER
----- Message from Sofy Brown sent at 11/4/2024  8:36 AM CST -----  Urine culture positive for bacteria E. coli.  Continue previously prescribed cefdinir

## 2024-11-04 NOTE — TELEPHONE ENCOUNTER
Spoke with patients spouse to let them know..    Urine culture positive for bacteria E. coli.  Continue previously prescribed cefdinir

## 2024-11-11 NOTE — TELEPHONE ENCOUNTER
Pt called back for follow up plan from completing abx. Pt denies any symptoms/problems. I advised her to keep f/u appt in December

## 2024-11-21 ENCOUNTER — OFFICE VISIT (OUTPATIENT)
Dept: UROLOGY | Facility: CLINIC | Age: 79
End: 2024-11-21
Payer: MEDICARE

## 2024-11-21 VITALS — HEIGHT: 63 IN | BODY MASS INDEX: 24.63 KG/M2 | TEMPERATURE: 97.1 F | WEIGHT: 139 LBS

## 2024-11-21 DIAGNOSIS — N39.0 RECURRENT UTI: Primary | ICD-10-CM

## 2024-11-21 LAB
BILIRUB BLD-MCNC: NEGATIVE MG/DL
CLARITY, POC: CLEAR
COLOR UR: YELLOW
GLUCOSE UR STRIP-MCNC: NEGATIVE MG/DL
KETONES UR QL: NEGATIVE
LEUKOCYTE EST, POC: ABNORMAL
NITRITE UR-MCNC: NEGATIVE MG/ML
PH UR: 5.5 [PH] (ref 5–8)
PROT UR STRIP-MCNC: ABNORMAL MG/DL
RBC # UR STRIP: ABNORMAL /UL
SP GR UR: 1.03 (ref 1–1.03)
UROBILINOGEN UR QL: ABNORMAL

## 2024-11-21 PROCEDURE — 87086 URINE CULTURE/COLONY COUNT: CPT

## 2024-11-21 PROCEDURE — 87186 SC STD MICRODIL/AGAR DIL: CPT

## 2024-11-21 PROCEDURE — 87077 CULTURE AEROBIC IDENTIFY: CPT

## 2024-11-21 RX ORDER — FLUCONAZOLE 100 MG/1
100 TABLET ORAL DAILY
Qty: 3 TABLET | Refills: 0 | Status: SHIPPED | OUTPATIENT
Start: 2024-11-21 | End: 2024-11-24

## 2024-11-21 NOTE — PROGRESS NOTES
Subjective    Ms. Bauer is 79 y.o. female    Chief Complaint: Chronic cystitis    History of Present Illness    79-year-old female established patient in with return of burning with urination and suprapubic pain and vaginal itching.  Patient with an extensive history of chronic cystitis seen less than 1 month ago in clinic urine culture again revealing E. Coli.  History of nitrofurantoin prophylaxis for which was stopped over the last couple months.  Has remained on over-the-counter supplementation cranberry and probiotic along with vaginal estrogen cream 3 nights weekly and natural coconut oil lubricant on off days.      renal ultrasound imaging  2 stones within the left kidney.     The following portions of the patient's history were reviewed and updated as appropriate: allergies, current medications, past family history, past medical history, past social history, past surgical history and problem list.    Review of Systems   Constitutional:  Negative for chills, fatigue and fever.   Gastrointestinal:  Negative for nausea and vomiting.   Genitourinary:  Positive for dysuria and vaginal pain. Negative for frequency and urgency.         Current Outpatient Medications:     amitriptyline (ELAVIL) 25 MG tablet, Take 1 tablet by mouth every night at bedtime., Disp: , Rfl:     amLODIPine (NORVASC) 10 MG tablet, , Disp: , Rfl:     aspirin 81 MG EC tablet, Take 1 tablet by mouth Daily., Disp: , Rfl:     atorvastatin (LIPITOR) 20 MG tablet, Take 1 tablet by mouth Daily., Disp: , Rfl:     busPIRone (BUSPAR) 10 MG tablet, Take 1 tablet by mouth 2 (Two) Times a Day., Disp: , Rfl:     Calcium Carb-Cholecalciferol (CALCIUM 600 + D PO), Take 1 tablet by mouth Daily., Disp: , Rfl:     cefdinir (OMNICEF) 300 MG capsule, Take 1 capsule by mouth 2 (Two) Times a Day., Disp: 20 capsule, Rfl: 0    citalopram (CeleXA) 40 MG tablet, Take 1 tablet by mouth Daily., Disp: , Rfl:     clonazePAM (KlonoPIN) 1 MG tablet, Take 1 tablet by mouth  At Night As Needed for Seizures., Disp: , Rfl:     dexlansoprazole (DEXILANT) 60 MG capsule, Take 1 capsule by mouth Daily., Disp: , Rfl:     DULoxetine (CYMBALTA) 60 MG capsule, , Disp: , Rfl:     escitalopram (LEXAPRO) 20 MG tablet, Take 1 tablet by mouth Every Morning., Disp: , Rfl:     estradiol (ESTRACE) 0.1 MG/GM vaginal cream, , Disp: , Rfl:     furosemide (LASIX) 20 MG tablet, Take 1 tablet by mouth As Needed., Disp: , Rfl:     HYDROcodone-acetaminophen (NORCO) 5-325 MG per tablet, Take 1 tablet by mouth Every 4 (Four) Hours As Needed for Moderate Pain., Disp: 15 tablet, Rfl: 0    levothyroxine (SYNTHROID, LEVOTHROID) 50 MCG tablet, Take 1 tablet by mouth Daily., Disp: , Rfl:     loratadine (CLARITIN) 10 MG tablet, Take 1 tablet by mouth Daily., Disp: , Rfl:     meloxicam (MOBIC) 15 MG tablet, Take 1 tablet by mouth Daily., Disp: , Rfl:     metoprolol tartrate (LOPRESSOR) 25 MG tablet, Take 1 tablet by mouth 2 (Two) Times a Day., Disp: , Rfl:     Multiple Vitamins-Minerals (MULTIVITAMIN WITH MINERALS) tablet tablet, Take 1 tablet by mouth Daily., Disp: , Rfl:     Omega-3 Fatty Acids (FISH OIL) 1200 MG capsule delayed-release, Take 1 capsule by mouth 2 (Two) Times a Day., Disp: , Rfl:     phenazopyridine (PYRIDIUM) 100 MG tablet, Take 1 tablet by mouth Every 12 (Twelve) Hours., Disp: 20 tablet, Rfl: 0    raNITIdine (ZANTAC) 300 MG tablet, Take 1 tablet by mouth Every Night., Disp: , Rfl:     amitriptyline (ELAVIL) 10 MG tablet, Take 1 tablet by mouth Daily. Take 2 tablets daily (Patient not taking: Reported on 11/21/2024), Disp: , Rfl:     amLODIPine (NORVASC) 5 MG tablet, Take 1 tablet by mouth Daily. (Patient not taking: Reported on 11/21/2024), Disp: , Rfl:     fluconazole (Diflucan) 100 MG tablet, Take 1 tablet by mouth Daily for 3 days., Disp: 3 tablet, Rfl: 0    nitrofurantoin (MACRODANTIN) 50 MG capsule, Take 1 capsule by mouth Every Night. (Patient not taking: Reported on 11/21/2024), Disp: 90  "capsule, Rfl: 0    sulfamethoxazole-trimethoprim (Bactrim DS) 800-160 MG per tablet, Take 1 tablet by mouth 2 (Two) Times a Day. (Patient not taking: Reported on 11/21/2024), Disp: 20 tablet, Rfl: 0    Past Medical History:   Diagnosis Date    COPD (chronic obstructive pulmonary disease)     Hypertension     Thyroid disease        Past Surgical History:   Procedure Laterality Date    CATARACT EXTRACTION Bilateral     DILATATION AND CURETTAGE      x 2    ILEOSTOMY      REFRACTIVE SURGERY Bilateral        Social History     Socioeconomic History    Marital status:    Tobacco Use    Smoking status: Never     Passive exposure: Never    Smokeless tobacco: Never   Vaping Use    Vaping status: Never Used   Substance and Sexual Activity    Alcohol use: No    Drug use: No    Sexual activity: Defer       Family History   Problem Relation Age of Onset    Bipolar disorder Mother     Heart attack Father     Sick sinus syndrome Sister     Heart disease Brother     Sick sinus syndrome Brother        Objective    Temp 97.1 °F (36.2 °C)   Ht 160 cm (62.99\")   Wt 63 kg (139 lb)   BMI 24.63 kg/m²     Physical Exam  Nursing note reviewed.   Constitutional:       General: She is not in acute distress.     Appearance: Normal appearance. She is not ill-appearing.   HENT:      Nose: No congestion.   Abdominal:      Tenderness: There is no right CVA tenderness or left CVA tenderness.      Hernia: No hernia is present.   Skin:     General: Skin is warm and dry.   Neurological:      Mental Status: She is alert and oriented to person, place, and time.   Psychiatric:         Mood and Affect: Mood normal.         Behavior: Behavior normal.             Results for orders placed or performed in visit on 11/21/24   POC Urinalysis Dipstick, Multipro    Collection Time: 11/21/24  1:06 PM    Specimen: Urine   Result Value Ref Range    Color Yellow Yellow, Straw, Dark Yellow, Emilie    Clarity, UA Clear Clear    Glucose, UA Negative Negative " mg/dL    Bilirubin Negative Negative    Ketones, UA Negative Negative    Specific Gravity  1.030 1.005 - 1.030    Blood, UA Trace (A) Negative    pH, Urine 5.5 5.0 - 8.0    Protein, POC 30 mg/dL (A) Negative mg/dL    Urobilinogen, UA 0.2 E.U./dL Normal, 0.2 E.U./dL    Nitrite, UA Negative Negative    Leukocytes Small (1+) (A) Negative     Assessment and Plan    Diagnoses and all orders for this visit:    1. Recurrent UTI (Primary)  -     POC Urinalysis Dipstick, Multipro  -     Urine Culture - , Urine, Random Void  -     fluconazole (Diflucan) 100 MG tablet; Take 1 tablet by mouth Daily for 3 days.  Dispense: 3 tablet; Refill: 0      Will send urine back off for culture today will wait to treat with antibiotic until culture returns.  Patient reporting significant vaginal itching will send in more Diflucan    Keep scheduled follow-up December

## 2024-11-23 LAB — BACTERIA SPEC AEROBE CULT: ABNORMAL

## 2024-11-24 DIAGNOSIS — N30.00 ACUTE CYSTITIS WITHOUT HEMATURIA: Primary | ICD-10-CM

## 2024-11-25 ENCOUNTER — TELEPHONE (OUTPATIENT)
Dept: UROLOGY | Facility: CLINIC | Age: 79
End: 2024-11-25
Payer: MEDICARE

## 2024-11-25 NOTE — TELEPHONE ENCOUNTER
----- Message from Sofy Brown sent at 11/24/2024  2:21 PM CST -----  Urine culture positive for bacteria have sent in Augmentin to patient's pharmacy

## 2024-11-25 NOTE — TELEPHONE ENCOUNTER
Spoke with patient to let her know   Urine culture positive for bacteria have sent in Augmentin to patient's pharmacy     Patient verbalized understanding.

## 2024-12-12 NOTE — PROGRESS NOTES
Subjective    Ms. Bauer is 79 y.o. female    Chief Complaint: Recurrent UTI follow-up and for urine recheck    History of Present Illness    79-year-old female established patient in for urine recheck regarding history of frequent recurrent urinary tract infections last completed Augmentin 2 weeks ago due to again culture positive E. coli.  Patient now overall asymptomatic other than report of vaginal itching and vaginal pain.  Concerned for a yeast infection.  Remains on vaginal estrogen cream 3 nights weekly and coconut oil.  Previous history of nitrofurantoin prophylaxis that has worked well for patient in the past.     renal ultrasound imaging  2 stones within the left kidney.     The following portions of the patient's history were reviewed and updated as appropriate: allergies, current medications, past family history, past medical history, past social history, past surgical history and problem list.    Review of Systems   Constitutional:  Negative for chills, fatigue and fever.   Gastrointestinal:  Negative for nausea and vomiting.   Genitourinary:  Positive for vaginal pain.         Current Outpatient Medications:     amitriptyline (ELAVIL) 25 MG tablet, Take 1 tablet by mouth every night at bedtime., Disp: , Rfl:     amLODIPine (NORVASC) 10 MG tablet, , Disp: , Rfl:     aspirin 81 MG EC tablet, Take 1 tablet by mouth Daily., Disp: , Rfl:     atorvastatin (LIPITOR) 20 MG tablet, Take 1 tablet by mouth Daily., Disp: , Rfl:     busPIRone (BUSPAR) 10 MG tablet, Take 1 tablet by mouth 2 (Two) Times a Day., Disp: , Rfl:     Calcium Carb-Cholecalciferol (CALCIUM 600 + D PO), Take 1 tablet by mouth Daily., Disp: , Rfl:     citalopram (CeleXA) 40 MG tablet, Take 1 tablet by mouth Daily., Disp: , Rfl:     clonazePAM (KlonoPIN) 1 MG tablet, Take 1 tablet by mouth At Night As Needed for Seizures., Disp: , Rfl:     dexlansoprazole (DEXILANT) 60 MG capsule, Take 1 capsule by mouth Daily., Disp: , Rfl:     DULoxetine  (CYMBALTA) 60 MG capsule, , Disp: , Rfl:     escitalopram (LEXAPRO) 20 MG tablet, Take 1 tablet by mouth Every Morning., Disp: , Rfl:     estradiol (ESTRACE) 0.1 MG/GM vaginal cream, , Disp: , Rfl:     furosemide (LASIX) 20 MG tablet, Take 1 tablet by mouth As Needed., Disp: , Rfl:     HYDROcodone-acetaminophen (NORCO) 5-325 MG per tablet, Take 1 tablet by mouth Every 4 (Four) Hours As Needed for Moderate Pain., Disp: 15 tablet, Rfl: 0    levothyroxine (SYNTHROID, LEVOTHROID) 100 MCG tablet, Take 1 tablet by mouth Daily., Disp: , Rfl:     loratadine (CLARITIN) 10 MG tablet, Take 1 tablet by mouth Daily., Disp: , Rfl:     meloxicam (MOBIC) 15 MG tablet, Take 1 tablet by mouth Daily., Disp: , Rfl:     metoprolol tartrate (LOPRESSOR) 25 MG tablet, Take 1 tablet by mouth 2 (Two) Times a Day., Disp: , Rfl:     Multiple Vitamins-Minerals (MULTIVITAMIN WITH MINERALS) tablet tablet, Take 1 tablet by mouth Daily., Disp: , Rfl:     Omega-3 Fatty Acids (FISH OIL) 1200 MG capsule delayed-release, Take 1 capsule by mouth 2 (Two) Times a Day., Disp: , Rfl:     raNITIdine (ZANTAC) 300 MG tablet, Take 1 tablet by mouth Every Night., Disp: , Rfl:     fluconazole (Diflucan) 100 MG tablet, Take 1 tablet by mouth Daily for 5 doses., Disp: 5 tablet, Rfl: 0    nitrofurantoin (MACRODANTIN) 50 MG capsule, Take 1 capsule by mouth Every Night., Disp: 90 capsule, Rfl: 0    phenazopyridine (PYRIDIUM) 100 MG tablet, Take 1 tablet by mouth Every 12 (Twelve) Hours. (Patient not taking: Reported on 12/19/2024), Disp: 20 tablet, Rfl: 0    Past Medical History:   Diagnosis Date    COPD (chronic obstructive pulmonary disease)     Hypertension     Thyroid disease        Past Surgical History:   Procedure Laterality Date    CATARACT EXTRACTION Bilateral     DILATATION AND CURETTAGE      x 2    ILEOSTOMY      REFRACTIVE SURGERY Bilateral        Social History     Socioeconomic History    Marital status:    Tobacco Use    Smoking status: Never      "Passive exposure: Never    Smokeless tobacco: Never   Vaping Use    Vaping status: Never Used   Substance and Sexual Activity    Alcohol use: No    Drug use: No    Sexual activity: Defer       Family History   Problem Relation Age of Onset    Bipolar disorder Mother     Heart attack Father     Sick sinus syndrome Sister     Heart disease Brother     Sick sinus syndrome Brother        Objective    Temp 98.2 °F (36.8 °C) (Temporal)   Ht 160 cm (63\")   Wt 63 kg (139 lb)   BMI 24.62 kg/m²     Physical Exam  Nursing note reviewed.   Constitutional:       General: She is not in acute distress.     Appearance: Normal appearance. She is not ill-appearing.   HENT:      Nose: No congestion.   Abdominal:      Tenderness: There is no right CVA tenderness or left CVA tenderness.      Hernia: No hernia is present.   Skin:     General: Skin is warm and dry.   Neurological:      Mental Status: She is alert and oriented to person, place, and time.   Psychiatric:         Mood and Affect: Mood normal.         Behavior: Behavior normal.             Results for orders placed or performed in visit on 12/19/24   POC Urinalysis Dipstick, Multipro    Collection Time: 12/19/24  1:10 PM    Specimen: Urine   Result Value Ref Range    Color Yellow Yellow, Straw, Dark Yellow, Emilie    Clarity, UA Clear Clear    Glucose, UA Negative Negative mg/dL    Bilirubin Negative Negative    Ketones, UA Negative Negative    Specific Gravity  1.030 1.005 - 1.030    Blood, UA Negative Negative    pH, Urine 5.0 5.0 - 8.0    Protein, POC Negative Negative mg/dL    Urobilinogen, UA 0.2 E.U./dL Normal, 0.2 E.U./dL    Nitrite, UA Negative Negative    Leukocytes Trace (A) Negative     Assessment and Plan    Diagnoses and all orders for this visit:    1. Recurrent UTI (Primary)  -     POC Urinalysis Dipstick, Multipro  -     fluconazole (Diflucan) 100 MG tablet; Take 1 tablet by mouth Daily for 5 doses.  Dispense: 5 tablet; Refill: 0  -     nitrofurantoin " (MACRODANTIN) 50 MG capsule; Take 1 capsule by mouth Every Night.  Dispense: 90 capsule; Refill: 0      Urinalysis much improved.  Would now like to start back on low-dose antibiotic we will again try nitrofurantoin.  Will send in as needed Diflucan for yeast have instructed patient only to use when needed    folLow up 3 months

## 2024-12-19 ENCOUNTER — OFFICE VISIT (OUTPATIENT)
Dept: UROLOGY | Facility: CLINIC | Age: 79
End: 2024-12-19
Payer: MEDICARE

## 2024-12-19 VITALS — TEMPERATURE: 98.2 F | BODY MASS INDEX: 24.63 KG/M2 | WEIGHT: 139 LBS | HEIGHT: 63 IN

## 2024-12-19 DIAGNOSIS — N39.0 RECURRENT UTI: Primary | ICD-10-CM

## 2024-12-19 LAB
BILIRUB BLD-MCNC: NEGATIVE MG/DL
CLARITY, POC: CLEAR
COLOR UR: YELLOW
GLUCOSE UR STRIP-MCNC: NEGATIVE MG/DL
KETONES UR QL: NEGATIVE
LEUKOCYTE EST, POC: ABNORMAL
NITRITE UR-MCNC: NEGATIVE MG/ML
PH UR: 5 [PH] (ref 5–8)
PROT UR STRIP-MCNC: NEGATIVE MG/DL
RBC # UR STRIP: NEGATIVE /UL
SP GR UR: 1.03 (ref 1–1.03)
UROBILINOGEN UR QL: ABNORMAL

## 2024-12-19 RX ORDER — NITROFURANTOIN MACROCRYSTALS 50 MG/1
50 CAPSULE ORAL NIGHTLY
Qty: 90 CAPSULE | Refills: 0 | Status: SHIPPED | OUTPATIENT
Start: 2024-12-19

## 2024-12-19 RX ORDER — FLUCONAZOLE 100 MG/1
100 TABLET ORAL DAILY
Qty: 5 TABLET | Refills: 0 | Status: SHIPPED | OUTPATIENT
Start: 2024-12-19 | End: 2024-12-24

## 2024-12-19 RX ORDER — LEVOTHYROXINE SODIUM 100 UG/1
1 TABLET ORAL DAILY
COMMUNITY
Start: 2024-12-15

## 2025-02-13 PROBLEM — M18.12 PRIMARY OSTEOARTHRITIS OF FIRST CARPOMETACARPAL JOINT OF LEFT HAND: Status: ACTIVE | Noted: 2025-02-13

## 2025-02-13 PROBLEM — M18.11 PRIMARY OSTEOARTHRITIS OF FIRST CARPOMETACARPAL JOINT OF RIGHT HAND: Status: ACTIVE | Noted: 2025-02-13

## 2025-02-13 NOTE — PROGRESS NOTES
Take 1 tablet by mouth 2 times daily   Yes Canadce Nur MD   Multiple Vitamins-Minerals (THERA M PLUS) TABS Take 1 tablet by mouth daily   Yes Candace Nur MD   nitrofurantoin (MACRODANTIN) 50 MG capsule Take 1 capsule by mouth nightly 12/19/24  Yes Candace Nur MD   HYDROcodone-acetaminophen (NORCO) 7.5-325 MG per tablet Take 1 tablet by mouth every 6 hours as needed for Pain. Max Daily Amount: 4 tablets   Yes Candace Nur MD   Lactobacillus (ACIDOPHILUS PO) Take by mouth   Yes Candace Nur MD   D-Mannose 500 MG CAPS Take by mouth   Yes Candace Nur MD       Allergies:  Patient has no known allergies.    Social History:   Social History     Occupational History    Not on file   Tobacco Use    Smoking status: Never    Smokeless tobacco: Never   Substance and Sexual Activity    Alcohol use: Not on file    Drug use: Not on file    Sexual activity: Not on file        Family History:   No family history on file.    Review of Systems  System  Neg/Pos  Details  Constitutional  Negative  Chills, Fatigue, Fever and Night Sweats  Respiratory  Negative  Chest Pain, Cough and Dyspnea  Cardio   Negative  Leg Swelling  GI   Negative  Abdominal Pain, Constipation, Nausea and Vomiting     Negative  Urinary Incontinence   Endocrine  Negative  Weight Gain and Weight Loss  MS   Negative  Except as noted in HPI and Chief Complaint    PHYSICAL EXAM:    Vitals:   Vitals:    02/14/25 1031   Weight: 64 kg (141 lb)   Height: 1.6 m (5' 3\")     General:  Appears stated age, no distress.  Orientation:  Alert and oriented to time, place, and person.  Mood and Affect:  Cooperative and pleasant.  Gait: Heel to gait  Cardiovascular:  Symmetric 1-2 plus pulses in upper and lower extremities.  Lymph:  No cervical or inguinal lymphadenopathy noted.  Sensation:  Grossly intact to light touch.  DTR:  Normal, no pathologic reflexes.  Coordination/balance:  Normal    Musculoskeletal:   BILATERAL

## 2025-02-14 ENCOUNTER — OFFICE VISIT (OUTPATIENT)
Age: 80
End: 2025-02-14

## 2025-02-14 VITALS — BODY MASS INDEX: 24.98 KG/M2 | HEIGHT: 63 IN | WEIGHT: 141 LBS

## 2025-02-14 DIAGNOSIS — M18.11 PRIMARY OSTEOARTHRITIS OF FIRST CARPOMETACARPAL JOINT OF RIGHT HAND: Primary | ICD-10-CM

## 2025-02-14 DIAGNOSIS — M18.12 PRIMARY OSTEOARTHRITIS OF FIRST CARPOMETACARPAL JOINT OF LEFT HAND: ICD-10-CM

## 2025-02-14 RX ORDER — LIDOCAINE HYDROCHLORIDE 10 MG/ML
2 INJECTION, SOLUTION INFILTRATION; PERINEURAL ONCE
Status: COMPLETED | OUTPATIENT
Start: 2025-02-14 | End: 2025-02-14

## 2025-02-14 RX ORDER — METOPROLOL TARTRATE 25 MG/1
25 TABLET, FILM COATED ORAL 2 TIMES DAILY
COMMUNITY

## 2025-02-14 RX ORDER — CLONAZEPAM 1 MG/1
1 TABLET ORAL NIGHTLY PRN
COMMUNITY

## 2025-02-14 RX ORDER — HYDROCODONE BITARTRATE AND ACETAMINOPHEN 7.5; 325 MG/1; MG/1
1 TABLET ORAL EVERY 6 HOURS PRN
COMMUNITY

## 2025-02-14 RX ORDER — AMLODIPINE BESYLATE 10 MG/1
TABLET ORAL
COMMUNITY
Start: 2024-09-27

## 2025-02-14 RX ORDER — FUROSEMIDE 20 MG/1
20 TABLET ORAL PRN
COMMUNITY

## 2025-02-14 RX ORDER — ATORVASTATIN CALCIUM 20 MG/1
20 TABLET, FILM COATED ORAL DAILY
COMMUNITY
Start: 2024-09-16

## 2025-02-14 RX ORDER — TRIAMCINOLONE ACETONIDE 40 MG/ML
40 INJECTION, SUSPENSION INTRA-ARTICULAR; INTRAMUSCULAR ONCE
Status: COMPLETED | OUTPATIENT
Start: 2025-02-14 | End: 2025-02-14

## 2025-02-14 RX ORDER — DEXLANSOPRAZOLE 60 MG/1
60 CAPSULE, DELAYED RELEASE ORAL DAILY
COMMUNITY

## 2025-02-14 RX ORDER — ASPIRIN 81 MG/1
81 TABLET ORAL DAILY
COMMUNITY

## 2025-02-14 RX ORDER — MULTIPLE VITAMINS W/ MINERALS TAB 9MG-400MCG
1 TAB ORAL DAILY
COMMUNITY

## 2025-02-14 RX ORDER — LEVOTHYROXINE SODIUM 100 UG/1
100 TABLET ORAL DAILY
COMMUNITY
Start: 2024-12-15

## 2025-02-14 RX ORDER — BUSPIRONE HYDROCHLORIDE 10 MG/1
10 TABLET ORAL 2 TIMES DAILY
COMMUNITY

## 2025-02-14 RX ORDER — CITALOPRAM HYDROBROMIDE 40 MG/1
40 TABLET ORAL DAILY
COMMUNITY

## 2025-02-14 RX ORDER — NITROFURANTOIN MACROCRYSTALS 50 MG/1
50 CAPSULE ORAL NIGHTLY
COMMUNITY
Start: 2024-12-19

## 2025-02-14 RX ADMIN — TRIAMCINOLONE ACETONIDE 40 MG: 40 INJECTION, SUSPENSION INTRA-ARTICULAR; INTRAMUSCULAR at 12:35

## 2025-02-14 RX ADMIN — LIDOCAINE HYDROCHLORIDE 2 ML: 10 INJECTION, SOLUTION INFILTRATION; PERINEURAL at 12:37

## 2025-02-14 RX ADMIN — TRIAMCINOLONE ACETONIDE 40 MG: 40 INJECTION, SUSPENSION INTRA-ARTICULAR; INTRAMUSCULAR at 12:36

## 2025-03-13 NOTE — PROGRESS NOTES
Subjective    Ms. Bauer is 80 y.o. female    Chief Complaint: 3 Month follow-up recurrent UTI    History of Present Illness    80-year-old female established patient in for 3-month follow-up regarding history of recurrent urinary tract infections.  Patient had previously been treated with nitrofurantoin prophylaxis due to the frequent recurrence of infections.  Had been treated for approximately 6 months straight was only off of the medication for a couple weeks when patient ran experienced return of symptoms patient then experienced 2 urine cultures 1 the end of October and 1 the end of November both positive for E. coli.  Therefore patient was started back on the nitrofurantoin prophylaxis.    Since starting back on the antibiotic prophylaxis has remained asymptomatic.  No infections reported.      renal ultrasound imaging  2 stones within the left kidney.     The following portions of the patient's history were reviewed and updated as appropriate: allergies, current medications, past family history, past medical history, past social history, past surgical history and problem list.    Review of Systems   Constitutional:  Negative for chills, fatigue and fever.   Gastrointestinal:  Negative for nausea and vomiting.   Genitourinary:  Negative for dyspareunia, dysuria, flank pain, frequency, hematuria, pelvic pain and urgency.         Current Outpatient Medications:     ACIDOPHILUS LACTOBACILLUS PO, Take  by mouth., Disp: , Rfl:     amitriptyline (ELAVIL) 25 MG tablet, Take 1 tablet by mouth every night at bedtime., Disp: , Rfl:     amLODIPine (NORVASC) 10 MG tablet, , Disp: , Rfl:     aspirin 81 MG EC tablet, Take 1 tablet by mouth Daily., Disp: , Rfl:     atorvastatin (LIPITOR) 20 MG tablet, Take 1 tablet by mouth Daily., Disp: , Rfl:     busPIRone (BUSPAR) 10 MG tablet, Take 1 tablet by mouth 2 (Two) Times a Day., Disp: , Rfl:     Calcium Carb-Cholecalciferol (CALCIUM 600 + D PO), Take 1 tablet by mouth Daily.,  Disp: , Rfl:     citalopram (CeleXA) 40 MG tablet, Take 1 tablet by mouth Daily., Disp: , Rfl:     clonazePAM (KlonoPIN) 1 MG tablet, Take 1 tablet by mouth At Night As Needed for Seizures., Disp: , Rfl:     D-Mannose 500 MG capsule, Take  by mouth., Disp: , Rfl:     dexlansoprazole (DEXILANT) 60 MG capsule, Take 1 capsule by mouth Daily., Disp: , Rfl:     estradiol (ESTRACE) 0.1 MG/GM vaginal cream, , Disp: , Rfl:     furosemide (LASIX) 20 MG tablet, Take 1 tablet by mouth As Needed., Disp: , Rfl:     HYDROcodone-acetaminophen (NORCO) 5-325 MG per tablet, Take 1 tablet by mouth Every 4 (Four) Hours As Needed for Moderate Pain., Disp: 15 tablet, Rfl: 0    levothyroxine (SYNTHROID, LEVOTHROID) 100 MCG tablet, Take 1 tablet by mouth Daily., Disp: , Rfl:     loratadine (CLARITIN) 10 MG tablet, Take 1 tablet by mouth Daily., Disp: , Rfl:     metoprolol tartrate (LOPRESSOR) 25 MG tablet, Take 1 tablet by mouth 2 (Two) Times a Day., Disp: , Rfl:     Multiple Vitamins-Minerals (MULTIVITAMIN WITH MINERALS) tablet tablet, Take 1 tablet by mouth Daily., Disp: , Rfl:     Omega-3 Fatty Acids (FISH OIL) 1200 MG capsule delayed-release, Take 1 capsule by mouth 2 (Two) Times a Day., Disp: , Rfl:     raNITIdine (ZANTAC) 300 MG tablet, Take 1 tablet by mouth Every Night., Disp: , Rfl:     DULoxetine (CYMBALTA) 60 MG capsule, , Disp: , Rfl:     escitalopram (LEXAPRO) 20 MG tablet, Take 1 tablet by mouth Every Morning., Disp: , Rfl:     meloxicam (MOBIC) 15 MG tablet, Take 1 tablet by mouth Daily. (Patient not taking: Reported on 3/20/2025), Disp: , Rfl:     methenamine (HIPREX) 1 g tablet, Take 1 tablet by mouth Daily., Disp: 90 tablet, Rfl: 3    phenazopyridine (PYRIDIUM) 100 MG tablet, Take 1 tablet by mouth 3 (Three) Times a Day As Needed for Bladder Spasms., Disp: 20 tablet, Rfl: 0    Past Medical History:   Diagnosis Date    COPD (chronic obstructive pulmonary disease)     Hypertension     Thyroid disease        Past Surgical  "History:   Procedure Laterality Date    CATARACT EXTRACTION Bilateral     DILATATION AND CURETTAGE      x 2    ILEOSTOMY      REFRACTIVE SURGERY Bilateral        Social History     Socioeconomic History    Marital status:    Tobacco Use    Smoking status: Never     Passive exposure: Never    Smokeless tobacco: Never   Vaping Use    Vaping status: Never Used   Substance and Sexual Activity    Alcohol use: No    Drug use: No    Sexual activity: Defer       Family History   Problem Relation Age of Onset    Bipolar disorder Mother     Heart attack Father     Sick sinus syndrome Sister     Heart disease Brother     Sick sinus syndrome Brother        Objective    Temp 98.7 °F (37.1 °C)   Ht 160 cm (63\")   Wt 62.2 kg (137 lb 3.2 oz)   BMI 24.30 kg/m²     Physical Exam  Nursing note reviewed.   Constitutional:       General: She is not in acute distress.     Appearance: Normal appearance. She is not ill-appearing.   HENT:      Nose: No congestion.   Abdominal:      Tenderness: There is no right CVA tenderness or left CVA tenderness.      Hernia: No hernia is present.   Skin:     General: Skin is warm and dry.   Neurological:      Mental Status: She is alert and oriented to person, place, and time.   Psychiatric:         Mood and Affect: Mood normal.         Behavior: Behavior normal.             Results for orders placed or performed in visit on 03/20/25   POC Urinalysis Dipstick, Multipro    Collection Time: 03/20/25  1:09 PM    Specimen: Urine   Result Value Ref Range    Color Yellow Yellow, Straw, Dark Yellow, Emilie    Clarity, UA Clear Clear    Glucose, UA Negative Negative mg/dL    Bilirubin Negative Negative    Ketones, UA Negative Negative    Specific Gravity  1.030 1.005 - 1.030    Blood, UA Negative Negative    pH, Urine 5.5 5.0 - 8.0    Protein, POC Negative Negative mg/dL    Urobilinogen, UA 0.2 E.U./dL Normal, 0.2 E.U./dL    Nitrite, UA Negative Negative    Leukocytes Negative Negative     Assessment " and Plan    Diagnoses and all orders for this visit:    1. Recurrent UTI (Primary)  -     POC Urinalysis Dipstick, Multipro  -     methenamine (HIPREX) 1 g tablet; Take 1 tablet by mouth Daily.  Dispense: 90 tablet; Refill: 3  -     phenazopyridine (PYRIDIUM) 100 MG tablet; Take 1 tablet by mouth 3 (Three) Times a Day As Needed for Bladder Spasms.  Dispense: 20 tablet; Refill: 0      Patient finished the last nitrofurantoin last night to complete the full 3-month course of the Macrobid prophylaxis.  Given patient has been been on this medication frequently over the course of the last year would like to hold off on any further Macrobid at this time.  I would now like to try patient on a different medication methenamine.    Will have patient follow-up 3 months

## 2025-03-20 ENCOUNTER — OFFICE VISIT (OUTPATIENT)
Dept: UROLOGY | Facility: CLINIC | Age: 80
End: 2025-03-20
Payer: MEDICARE

## 2025-03-20 VITALS — TEMPERATURE: 98.7 F | BODY MASS INDEX: 24.31 KG/M2 | HEIGHT: 63 IN | WEIGHT: 137.2 LBS

## 2025-03-20 DIAGNOSIS — N39.0 RECURRENT UTI: Primary | ICD-10-CM

## 2025-03-20 RX ORDER — METHENAMINE HIPPURATE 1000 MG/1
1 TABLET ORAL DAILY
Qty: 90 TABLET | Refills: 3 | Status: SHIPPED | OUTPATIENT
Start: 2025-03-20

## 2025-03-20 RX ORDER — PHENAZOPYRIDINE HYDROCHLORIDE 100 MG/1
100 TABLET, FILM COATED ORAL 3 TIMES DAILY PRN
Qty: 20 TABLET | Refills: 0 | Status: SHIPPED | OUTPATIENT
Start: 2025-03-20

## 2025-05-29 ENCOUNTER — OFFICE VISIT (OUTPATIENT)
Dept: UROLOGY | Facility: CLINIC | Age: 80
End: 2025-05-29
Payer: MEDICARE

## 2025-05-29 VITALS — WEIGHT: 133 LBS | TEMPERATURE: 97.7 F | HEIGHT: 63 IN | BODY MASS INDEX: 23.57 KG/M2

## 2025-05-29 DIAGNOSIS — N39.0 RECURRENT UTI: Primary | ICD-10-CM

## 2025-05-29 DIAGNOSIS — N30.00 ACUTE CYSTITIS WITHOUT HEMATURIA: ICD-10-CM

## 2025-05-29 LAB
BILIRUB BLD-MCNC: ABNORMAL MG/DL
CLARITY, POC: CLEAR
COLOR UR: ABNORMAL
GLUCOSE UR STRIP-MCNC: ABNORMAL MG/DL
KETONES UR QL: ABNORMAL
LEUKOCYTE EST, POC: ABNORMAL
NITRITE UR-MCNC: POSITIVE MG/ML
PH UR: 5 [PH] (ref 5–8)
PROT UR STRIP-MCNC: ABNORMAL MG/DL
RBC # UR STRIP: ABNORMAL /UL
SP GR UR: 1.01 (ref 1–1.03)
UROBILINOGEN UR QL: ABNORMAL

## 2025-05-29 PROCEDURE — 87086 URINE CULTURE/COLONY COUNT: CPT

## 2025-05-29 RX ORDER — ASCORBIC ACID 1000 MG
TABLET ORAL DAILY
COMMUNITY

## 2025-05-29 RX ORDER — PHENAZOPYRIDINE HYDROCHLORIDE 100 MG/1
100 TABLET, FILM COATED ORAL 3 TIMES DAILY PRN
Qty: 12 TABLET | Refills: 0 | Status: SHIPPED | OUTPATIENT
Start: 2025-05-29

## 2025-05-29 RX ORDER — NITROFURANTOIN MACROCRYSTALS 50 MG/1
50 CAPSULE ORAL
COMMUNITY

## 2025-05-29 RX ORDER — HYDROCODONE BITARTRATE AND ACETAMINOPHEN 7.5; 325 MG/1; MG/1
TABLET ORAL
COMMUNITY

## 2025-05-29 RX ORDER — CEFDINIR 300 MG/1
300 CAPSULE ORAL 2 TIMES DAILY
Qty: 20 CAPSULE | Refills: 0 | Status: SHIPPED | OUTPATIENT
Start: 2025-05-29

## 2025-05-29 NOTE — PROGRESS NOTES
Subjective    Ms. Bauer is 80 y.o. female    Chief Complaint: return of burning with urination and bladder pressure    History of Present Illness    80-year-old female established patient in with complaint of return burning with urination and bladder pressure over the last 24 hours.  Patient reports was just discharged from [facility] due to dysphagia and gastroenterology difficulties.  History of ileostomy for which required dilation.  Reports was discharged on ciprofloxacin completed as of 5/26/2025.  Was unsure if was due to more of a gastroenterology cause or urinary tract infection as patient states she was never told.  I do not have records to review.    Patient with a history of recurrent urinary tract infections for which has been maintained on methenamine daily as well as vaginal estrogen cream twice weekly.  Patient reports both medications were stopped during her 2-week hospitalization and have yet to be resumed.  Was discharged 5/17/2025.    The following portions of the patient's history were reviewed and updated as appropriate: allergies, current medications, past family history, past medical history, past social history, past surgical history and problem list.    Review of Systems   Constitutional:  Negative for chills, fatigue and fever.   Gastrointestinal:  Negative for nausea and vomiting.   Genitourinary:  Positive for difficulty urinating and dysuria.         Current Outpatient Medications:     ACIDOPHILUS LACTOBACILLUS PO, Take  by mouth., Disp: , Rfl:     amitriptyline (ELAVIL) 25 MG tablet, Take 1 tablet by mouth every night at bedtime., Disp: , Rfl:     amLODIPine (NORVASC) 10 MG tablet, , Disp: , Rfl:     aspirin 81 MG EC tablet, Take 1 tablet by mouth Daily., Disp: , Rfl:     atorvastatin (LIPITOR) 20 MG tablet, Take 1 tablet by mouth Daily., Disp: , Rfl:     busPIRone (BUSPAR) 10 MG tablet, Take 1 tablet by mouth 2 (Two) Times a Day., Disp: , Rfl:     Calcium  Carbonate (Super Calcium) 1500 (600 Ca) MG tablet, Every 12 (Twelve) Hours., Disp: , Rfl:     citalopram (CeleXA) 40 MG tablet, Take 1 tablet by mouth Daily., Disp: , Rfl:     clonazePAM (KlonoPIN) 1 MG tablet, Take 1 tablet by mouth At Night As Needed for Seizures., Disp: , Rfl:     Cranberry Juice Powder 425 MG capsule, Daily., Disp: , Rfl:     D-Mannose 500 MG capsule, Take  by mouth., Disp: , Rfl:     dexlansoprazole (DEXILANT) 60 MG capsule, Take 1 capsule by mouth Daily., Disp: , Rfl:     DULoxetine (CYMBALTA) 60 MG capsule, , Disp: , Rfl:     estradiol (ESTRACE) 0.1 MG/GM vaginal cream, , Disp: , Rfl:     furosemide (LASIX) 20 MG tablet, Take 1 tablet by mouth As Needed., Disp: , Rfl:     HYDROcodone-acetaminophen (NORCO) 7.5-325 MG per tablet, AT BEDTIME, Disp: , Rfl:     levothyroxine (SYNTHROID, LEVOTHROID) 100 MCG tablet, Take 1 tablet by mouth Daily., Disp: , Rfl:     loratadine (CLARITIN) 10 MG tablet, Take 1 tablet by mouth Daily., Disp: , Rfl:     methenamine (HIPREX) 1 g tablet, Take 1 tablet by mouth Daily., Disp: 90 tablet, Rfl: 3    metoprolol tartrate (LOPRESSOR) 25 MG tablet, Take 1 tablet by mouth 2 (Two) Times a Day., Disp: , Rfl:     Multiple Vitamins-Minerals (MULTIVITAMIN WITH MINERALS) tablet tablet, Take 1 tablet by mouth Daily., Disp: , Rfl:     nitrofurantoin (MACRODANTIN) 50 MG capsule, 1 capsule., Disp: , Rfl:     cefdinir (OMNICEF) 300 MG capsule, Take 1 capsule by mouth 2 (Two) Times a Day., Disp: 20 capsule, Rfl: 0    phenazopyridine (PYRIDIUM) 100 MG tablet, Take 1 tablet by mouth 3 (Three) Times a Day As Needed for Bladder Spasms., Disp: 12 tablet, Rfl: 0    Past Medical History:   Diagnosis Date    COPD (chronic obstructive pulmonary disease)     Hypertension     Thyroid disease        Past Surgical History:   Procedure Laterality Date    CATARACT EXTRACTION Bilateral     DILATATION AND CURETTAGE      x 2    ILEOSTOMY      REFRACTIVE SURGERY Bilateral     STOMA DILATATION   "05/2025       Social History     Socioeconomic History    Marital status:    Tobacco Use    Smoking status: Never     Passive exposure: Never    Smokeless tobacco: Never   Vaping Use    Vaping status: Never Used   Substance and Sexual Activity    Alcohol use: No    Drug use: No    Sexual activity: Defer       Family History   Problem Relation Age of Onset    Bipolar disorder Mother     Heart attack Father     Sick sinus syndrome Sister     Heart disease Brother     Sick sinus syndrome Brother        Objective    Temp 97.7 °F (36.5 °C) (Temporal)   Ht 160 cm (62.99\")   Wt 60.3 kg (133 lb)   BMI 23.57 kg/m²     Physical Exam        Results for orders placed or performed in visit on 05/29/25   POC Urinalysis Dipstick, Multipro    Collection Time: 05/29/25 10:34 AM    Specimen: Urine   Result Value Ref Range    Color Orange (A) Yellow, Straw, Dark Yellow, Emilie    Clarity, UA Clear Clear    Glucose,  mg/dL (A) Negative mg/dL    Bilirubin Moderate (2+) (A) Negative    Ketones, UA 15 mg/dL (A) Negative    Specific Gravity  1.015 1.005 - 1.030    Blood, UA Moderate (A) Negative    pH, Urine 5.0 5.0 - 8.0    Protein,  mg/dL (A) Negative mg/dL    Urobilinogen, UA 8 E.U./dL (A) Normal, 0.2 E.U./dL    Nitrite, UA Positive (A) Negative    Leukocytes Large (3+) (A) Negative     Assessment and Plan    Diagnoses and all orders for this visit:    1. Recurrent UTI (Primary)  -     POC Urinalysis Dipstick, Multipro  -     Urine Culture - Urine, Urine, Random Void  -     cefdinir (OMNICEF) 300 MG capsule; Take 1 capsule by mouth 2 (Two) Times a Day.  Dispense: 20 capsule; Refill: 0  -     phenazopyridine (PYRIDIUM) 100 MG tablet; Take 1 tablet by mouth 3 (Three) Times a Day As Needed for Bladder Spasms.  Dispense: 12 tablet; Refill: 0    2. Acute cystitis without hematuria  -     POC Urinalysis Dipstick, Multipro      Urinalysis unable to decipher as patient has been taking Pyridium therefore will send urine for " culture today.  Will send in more as needed Pyridium for the burning with urination and bladder pressure.  Will start on cefdinir based off of patient's last urine culture however will stop or change if needed when culture returns.    Once completion of full round cefdinir recommend immediately starting back on methenamine    For now keep scheduled follow-up with me next month

## 2025-05-30 ENCOUNTER — RESULTS FOLLOW-UP (OUTPATIENT)
Dept: UROLOGY | Facility: CLINIC | Age: 80
End: 2025-05-30
Payer: MEDICARE

## 2025-05-30 LAB — BACTERIA SPEC AEROBE CULT: NO GROWTH

## 2025-05-30 NOTE — TELEPHONE ENCOUNTER
Spoke with patient to let her know  NO bacteria found on culture     Patient verbalized understanding.

## 2025-06-10 ENCOUNTER — TELEPHONE (OUTPATIENT)
Dept: UROLOGY | Facility: CLINIC | Age: 80
End: 2025-06-10

## 2025-06-10 ENCOUNTER — OFFICE VISIT (OUTPATIENT)
Dept: UROLOGY | Facility: CLINIC | Age: 80
End: 2025-06-10
Payer: MEDICARE

## 2025-06-10 ENCOUNTER — TELEPHONE (OUTPATIENT)
Dept: UROLOGY | Facility: CLINIC | Age: 80
End: 2025-06-10
Payer: MEDICARE

## 2025-06-10 VITALS — TEMPERATURE: 97.8 F | HEIGHT: 63 IN | WEIGHT: 133 LBS | BODY MASS INDEX: 23.57 KG/M2

## 2025-06-10 DIAGNOSIS — N39.0 RECURRENT UTI: Primary | ICD-10-CM

## 2025-06-10 PROCEDURE — 87086 URINE CULTURE/COLONY COUNT: CPT

## 2025-06-10 RX ORDER — OXYBUTYNIN CHLORIDE 5 MG/1
5 TABLET ORAL 3 TIMES DAILY
Qty: 30 TABLET | Refills: 2 | Status: SHIPPED | OUTPATIENT
Start: 2025-06-10

## 2025-06-10 NOTE — TELEPHONE ENCOUNTER
Called patient and rescheduled her appointment from 6/17/25 to tomorrow, 6/11/25 at 1:15 pm with Sofy.

## 2025-06-10 NOTE — TELEPHONE ENCOUNTER
Caller: Merna Bauer     Relationship to Patient: SELF    Phone Number: [phone]     Reason for Call: PT IS CALLING BECAUSE IT STILL FEELS LIKE PRESSURE ON COLLEEN AND FEELS LIKE YOU NEED TO GO BUT CAN'T GO .PLEASE GIVE HER A CALL BACK

## 2025-06-10 NOTE — PROGRESS NOTES
Subjective    Ms. Bauer is 80 y.o. female    Chief Complaint: Continued bothersome lower urinary tract symptoms    History of Present Illness    80-year-old female established patient in with complaint of ongoing urinary symptoms consisting of burning with urination, suprapubic pain, significant increase in urine frequency and urgency.  Was seen 5/29/2025 in our office at which time urine was sent for culture which revealed no bacterial growth.  Despite no bacteria found patient completed the full course cefdinir.  Reports noted no improvement in symptoms after completion.  Started back on her maintenance methenamine as of 3 days ago.    Recently hospitalized [facility] due to dysphagia and gastroenterology difficulties.  History of ileostomy for which required dilation.  Reports was discharged on ciprofloxacin completed as of 5/26/2025.  Was unsure if was due to more of a gastroenterology cause or urinary tract infection as patient states she was never told.  I do not have records to review.     Patient with a history of recurrent urinary tract infections for which has been maintained on methenamine daily as well as vaginal estrogen cream twice weekly.     The following portions of the patient's history were reviewed and updated as appropriate: allergies, current medications, past family history, past medical history, past social history, past surgical history and problem list.    Review of Systems   Constitutional:  Negative for chills and fever.   Gastrointestinal:  Negative for abdominal pain, anal bleeding and blood in stool.   Genitourinary:  Positive for dysuria and urgency. Negative for hematuria.         Current Outpatient Medications:     ACIDOPHILUS LACTOBACILLUS PO, Take  by mouth., Disp: , Rfl:     amitriptyline (ELAVIL) 25 MG tablet, Take 1 tablet by mouth every night at bedtime., Disp: , Rfl:     amLODIPine (NORVASC) 10 MG tablet, , Disp: , Rfl:     aspirin 81 MG EC tablet, Take  1 tablet by mouth Daily., Disp: , Rfl:     atorvastatin (LIPITOR) 20 MG tablet, Take 1 tablet by mouth Daily., Disp: , Rfl:     busPIRone (BUSPAR) 10 MG tablet, Take 1 tablet by mouth 2 (Two) Times a Day., Disp: , Rfl:     Calcium Carbonate (Super Calcium) 1500 (600 Ca) MG tablet, Every 12 (Twelve) Hours., Disp: , Rfl:     citalopram (CeleXA) 40 MG tablet, Take 1 tablet by mouth Daily., Disp: , Rfl:     clonazePAM (KlonoPIN) 1 MG tablet, Take 1 tablet by mouth At Night As Needed for Seizures., Disp: , Rfl:     Cranberry Juice Powder 425 MG capsule, Daily., Disp: , Rfl:     D-Mannose 500 MG capsule, Take  by mouth., Disp: , Rfl:     dexlansoprazole (DEXILANT) 60 MG capsule, Take 1 capsule by mouth Daily., Disp: , Rfl:     DULoxetine (CYMBALTA) 60 MG capsule, , Disp: , Rfl:     estradiol (ESTRACE) 0.1 MG/GM vaginal cream, , Disp: , Rfl:     furosemide (LASIX) 20 MG tablet, Take 1 tablet by mouth As Needed., Disp: , Rfl:     HYDROcodone-acetaminophen (NORCO) 7.5-325 MG per tablet, AT BEDTIME, Disp: , Rfl:     levothyroxine (SYNTHROID, LEVOTHROID) 100 MCG tablet, Take 1 tablet by mouth Daily., Disp: , Rfl:     loratadine (CLARITIN) 10 MG tablet, Take 1 tablet by mouth Daily., Disp: , Rfl:     methenamine (HIPREX) 1 g tablet, Take 1 tablet by mouth Daily., Disp: 90 tablet, Rfl: 3    metoprolol tartrate (LOPRESSOR) 25 MG tablet, Take 1 tablet by mouth 2 (Two) Times a Day., Disp: , Rfl:     Multiple Vitamins-Minerals (MULTIVITAMIN WITH MINERALS) tablet tablet, Take 1 tablet by mouth Daily., Disp: , Rfl:     phenazopyridine (PYRIDIUM) 100 MG tablet, Take 1 tablet by mouth 3 (Three) Times a Day As Needed for Bladder Spasms., Disp: 12 tablet, Rfl: 0    cefdinir (OMNICEF) 300 MG capsule, Take 1 capsule by mouth 2 (Two) Times a Day. (Patient not taking: Reported on 6/10/2025), Disp: 20 capsule, Rfl: 0    nitrofurantoin (MACRODANTIN) 50 MG capsule, 1 capsule. (Patient not taking: Reported on 6/10/2025), Disp: , Rfl:      "oxybutynin (DITROPAN) 5 MG tablet, Take 1 tablet by mouth 3 (Three) Times a Day., Disp: 30 tablet, Rfl: 2    Past Medical History:   Diagnosis Date    COPD (chronic obstructive pulmonary disease)     Hypertension     Thyroid disease        Past Surgical History:   Procedure Laterality Date    CATARACT EXTRACTION Bilateral     DILATATION AND CURETTAGE      x 2    ILEOSTOMY      REFRACTIVE SURGERY Bilateral     STOMA DILATATION  05/2025       Social History     Socioeconomic History    Marital status:    Tobacco Use    Smoking status: Never     Passive exposure: Never    Smokeless tobacco: Never   Vaping Use    Vaping status: Never Used   Substance and Sexual Activity    Alcohol use: No    Drug use: No    Sexual activity: Defer       Family History   Problem Relation Age of Onset    Bipolar disorder Mother     Heart attack Father     Sick sinus syndrome Sister     Heart disease Brother     Sick sinus syndrome Brother        Objective    Temp 97.8 °F (36.6 °C)   Ht 160 cm (62.99\")   Wt 60.3 kg (133 lb)   BMI 23.57 kg/m²     Physical Exam        Results for orders placed or performed in visit on 05/29/25   POC Urinalysis Dipstick, Multipro    Collection Time: 05/29/25 10:34 AM    Specimen: Urine   Result Value Ref Range    Color Orange (A) Yellow, Straw, Dark Yellow, Emilie    Clarity, UA Clear Clear    Glucose,  mg/dL (A) Negative mg/dL    Bilirubin Moderate (2+) (A) Negative    Ketones, UA 15 mg/dL (A) Negative    Specific Gravity  1.015 1.005 - 1.030    Blood, UA Moderate (A) Negative    pH, Urine 5.0 5.0 - 8.0    Protein,  mg/dL (A) Negative mg/dL    Urobilinogen, UA 8 E.U./dL (A) Normal, 0.2 E.U./dL    Nitrite, UA Positive (A) Negative    Leukocytes Large (3+) (A) Negative   Urine Culture - Urine, Urine, Random Void    Collection Time: 05/29/25 10:41 AM    Specimen: Urine, Random Void   Result Value Ref Range    Urine Culture No growth    Estimation of residual urine via abdominal " ultrasound  Residual Urine: 21 ml  Indication: bladder pain  Position: Supine  Examination: Incremental scanning of the suprapubic area using 3 MHz transducer using copious amounts of acoustic gel.   Findings: An anechoic area was demonstrated which represented the bladder, with measurement of residual urine as noted. I inspected this myself. In that the residual urine was stable or insignificant, no treatment will be necessary at this time.      Assessment and Plan    Diagnoses and all orders for this visit:    1. Recurrent UTI (Primary)  -     oxybutynin (DITROPAN) 5 MG tablet; Take 1 tablet by mouth 3 (Three) Times a Day.  Dispense: 30 tablet; Refill: 2  -     Urine Culture - Urine, Urine, Random Void      No improvement after completion of cefdinir despite negative urine culture.  Will send in oxybutynin 5 mg for patient to take up to 3 times daily for bladder spasms/bladder overactivity while waiting on repeat culture to be completed.  We did discuss symptoms of bladder overactivity as patient may be experiencing therefore felt as though the oxybutynin may be of benefit and worth a try    For now we will have follow-up 3 months.  Will contact patient once culture returns.

## 2025-06-10 NOTE — TELEPHONE ENCOUNTER
Hub staff attempted to follow warm transfer process and was unsuccessful     Caller: Merna Bauer     Relationship to patient: SELF    Best call back number: [phone]     Patient is needing: PT JUST SPOKE TO THE OFFICE AND SCHEDULED AN APPT W/VERONICA FOR TOMORROW.    PT CALLED BACK TO SEE IF SHE CAN BE SEEN TODAY. SHE SAYS SHE IS HURTING.    PLEASE GIVE PT A CALL BACK.

## 2025-06-12 ENCOUNTER — RESULTS FOLLOW-UP (OUTPATIENT)
Dept: UROLOGY | Facility: CLINIC | Age: 80
End: 2025-06-12
Payer: MEDICARE

## 2025-06-12 LAB — BACTERIA SPEC AEROBE CULT: NO GROWTH

## 2025-06-13 ENCOUNTER — TELEPHONE (OUTPATIENT)
Dept: UROLOGY | Facility: CLINIC | Age: 80
End: 2025-06-13
Payer: MEDICARE

## 2025-06-13 DIAGNOSIS — N39.0 RECURRENT UTI: Primary | ICD-10-CM

## 2025-06-13 NOTE — TELEPHONE ENCOUNTER
Pt called in stating that pressure within bladder has not improved since starting the oxybutynin, she is wanting to know what other options are. Please advise.

## 2025-06-13 NOTE — TELEPHONE ENCOUNTER
Please find out if pt had recent imaging of abd/pelvis when was recently hospitalized. If so we need that to review. Also we can always bring back in to get cath specimen to send for guidance uTI

## 2025-06-16 ENCOUNTER — TELEPHONE (OUTPATIENT)
Dept: UROLOGY | Facility: CLINIC | Age: 80
End: 2025-06-16
Payer: MEDICARE

## 2025-06-16 ENCOUNTER — PROCEDURE VISIT (OUTPATIENT)
Dept: UROLOGY | Facility: CLINIC | Age: 80
End: 2025-06-16
Payer: MEDICARE

## 2025-06-16 DIAGNOSIS — N30.00 ACUTE CYSTITIS WITHOUT HEMATURIA: ICD-10-CM

## 2025-06-16 DIAGNOSIS — N39.0 RECURRENT UTI: Primary | ICD-10-CM

## 2025-06-16 PROCEDURE — 87086 URINE CULTURE/COLONY COUNT: CPT

## 2025-06-16 RX ORDER — PHENAZOPYRIDINE HYDROCHLORIDE 100 MG/1
100 TABLET, FILM COATED ORAL 3 TIMES DAILY PRN
Qty: 20 TABLET | Refills: 1 | Status: SHIPPED | OUTPATIENT
Start: 2025-06-16

## 2025-06-16 NOTE — PROGRESS NOTES
The patient states She is here today for catheter insertion.  Patient of LORA Josue. Using sterile technique a new In & out catheter was placed, specimen was retrieved for culture.  Patient tolerated the procedure well.  Patient verbalized understanding. LORA Shah was in the office at the time of procedure.    I have reviewed and agree with medical assistance documentation above.

## 2025-06-16 NOTE — TELEPHONE ENCOUNTER
Caller: Merna Bauer    Relationship: Self    Best call back number: [phone]    Requested Prescriptions: PHENAZOPYRIDINE  Requested Prescriptions      No prescriptions requested or ordered in this encounter        Pharmacy where request should be sent:  [facility], [address] [place] [address]    Last office visit with prescribing clinician: 6/10/2025   Last telemedicine visit with prescribing clinician: Visit date not found   Next office visit with prescribing clinician: 9/11/2025     Additional details provided by patient: PT HAS ALSO BEEN TAKING OXYBUTYNIN. IT IS NOT WORKING. CAN YOU SEND IN SOMETHING ELSE FOR HER.THANK YOU    Does the patient have less than a 3 day supply:  [x] Yes  [] No    Would you like a call back once the refill request has been completed: [] Yes [x] No    If the office needs to give you a call back, can they leave a voicemail: [] Yes [x] No    Batool Mosqueda Rep   06/16/25 08:27 CDT

## 2025-06-16 NOTE — TELEPHONE ENCOUNTER
Caller: Juancarlos Merna S    Relationship: Self    Best call back number: [phone]    What medication are you requesting: SOMETHING TO STOP PRESSURE AND PAIN    What are your current symptoms: PRESSURE AND PAIN    How long have you been experiencing symptoms: ONE YEAR    Have you had these symptoms before:    [x] Yes  [] No    Have you been treated for these symptoms before:   [x] Yes  [] No    If a prescription is needed, what is your preferred pharmacy and phone number:  [facility], [address] [place] [address]    Additional notes: I DO NOT HAVE AN APPT UNTIL 06.24.2025. THANK YOU

## 2025-06-17 ENCOUNTER — TELEPHONE (OUTPATIENT)
Dept: UROLOGY | Facility: CLINIC | Age: 80
End: 2025-06-17

## 2025-06-17 NOTE — TELEPHONE ENCOUNTER
Caller: Merna Bauer    Relationship: Self    Best call back number: [phone]    What orders are you requesting (i.e. lab or imaging): CT ABDOMEN    In what timeframe would the patient need to come in: ASAP    Where will you receive your lab/imaging services: [facility]    Additional notes: PT WLD LIKE ORDERS FOR CT SCAN TO BE FAXED TO [facility], THEY ARE ABLE TO SCHEDULE CT SCAN NEXT WEEK.    PLEASE CALL TO CONFIRM ORDERS HAVE BEEN SENT.

## 2025-06-18 LAB — BACTERIA SPEC AEROBE CULT: NO GROWTH

## 2025-06-19 ENCOUNTER — RESULTS FOLLOW-UP (OUTPATIENT)
Dept: UROLOGY | Facility: CLINIC | Age: 80
End: 2025-06-19
Payer: MEDICARE

## 2025-06-24 ENCOUNTER — RESULTS FOLLOW-UP (OUTPATIENT)
Dept: UROLOGY | Facility: CLINIC | Age: 80
End: 2025-06-24
Payer: MEDICARE

## 2025-06-24 DIAGNOSIS — N39.0 RECURRENT UTI: ICD-10-CM

## 2025-06-25 NOTE — TELEPHONE ENCOUNTER
Pt called for results. I read urine culture result & Ct result.  Pt prefers Dr. Mi for cystoscopy. Transferred pt to  to get scheduled.

## 2025-07-18 ENCOUNTER — PROCEDURE VISIT (OUTPATIENT)
Dept: UROLOGY | Facility: CLINIC | Age: 80
End: 2025-07-18
Payer: MEDICARE

## 2025-07-18 DIAGNOSIS — N39.41 URGE INCONTINENCE: ICD-10-CM

## 2025-07-18 DIAGNOSIS — N32.89 BLADDER WALL THICKENING: Primary | ICD-10-CM

## 2025-07-18 DIAGNOSIS — N32.9 LESION OF BLADDER: ICD-10-CM

## 2025-07-18 PROCEDURE — 99214 OFFICE O/P EST MOD 30 MIN: CPT | Performed by: UROLOGY

## 2025-07-18 RX ORDER — CEFTRIAXONE 1 G/1
1 INJECTION, POWDER, FOR SOLUTION INTRAMUSCULAR; INTRAVENOUS ONCE
OUTPATIENT
Start: 2025-07-18 | End: 2025-07-18

## 2025-07-18 NOTE — PROGRESS NOTES
CC: I am here for the doctor to look at my bladder    Cystoscopy procedure note  Pre- operative diagnosis:  Hematuria, Lower urinary tract symptoms, and Dysuria    Post operative diagnosis:  Bladder lesion    Procedure:  The patient was prepped and draped in a normal sterile fashion.  The urethra was anesthetized with 2% lidocaine jelly.  A flexible cystoscope was introduced per urethra.      Urethra: There is no evidence of stenosis.  No mass of the urethra.  Bladder:  Bladder looks abnormal.  There is some debris that is layered in the bladder and formed almost a pseudomembrane over part of the posterior aspect of the bladder.  Just irrigation through the flexible scope though will remove this so it is not attached to the bladder wall.  There is a very concerning area in the right anterior lateral aspect of the bladder wall that I would estimate to be about 3 x 3 cm which is erythematous and has a bullous edema type look to it.  Patient has an ileostomy for ulcerative colitis    Patient tolerated the procedure well    Complications: none    Blood loss: minimal       ASSESSMENT AND PLAN          Problem List Items Addressed This Visit    None  Visit Diagnoses         Bladder wall thickening    -  Primary      Urge incontinence          Lesion of bladder              Given these recent events and today's cystoscopy, I had to evaluate the patient to assess fitness for possible surgery, formulate and discuss a plan, that included alternatives, risks and benefits of management.This is a significant, separately identifiable evaluation and management service . This went well beyond the typical description of procedural findings and therefore an additional evaluation and management was required.    No follow-ups on file.      Tanner Mi MD  7/18/2025  14:31 CDT

## 2025-07-18 NOTE — H&P (VIEW-ONLY)
Chief Complaint  Bladder lesion    Subjective          Merna Bauer presents to [facility]   This patient's had persistent dysuria with urgency and frequency and suprapubic pain for quite some time.  She says she even hurts when she sits down.  While UTI has been diagnosed and treated with antibiotics this has not relieved the symptoms.  Patient also has a history of ulcerative colitis and currently has an ileostomy.  She says she has not had any trouble with this.        Current Outpatient Medications:     ACIDOPHILUS LACTOBACILLUS PO, Take  by mouth., Disp: , Rfl:     amitriptyline (ELAVIL) 25 MG tablet, Take 1 tablet by mouth every night at bedtime., Disp: , Rfl:     amLODIPine (NORVASC) 10 MG tablet, , Disp: , Rfl:     aspirin 81 MG EC tablet, Take 1 tablet by mouth Daily., Disp: , Rfl:     atorvastatin (LIPITOR) 20 MG tablet, Take 1 tablet by mouth Daily., Disp: , Rfl:     busPIRone (BUSPAR) 10 MG tablet, Take 1 tablet by mouth 2 (Two) Times a Day., Disp: , Rfl:     Calcium Carbonate (Super Calcium) 1500 (600 Ca) MG tablet, Every 12 (Twelve) Hours., Disp: , Rfl:     cefdinir (OMNICEF) 300 MG capsule, Take 1 capsule by mouth 2 (Two) Times a Day. (Patient not taking: Reported on 6/10/2025), Disp: 20 capsule, Rfl: 0    citalopram (CeleXA) 40 MG tablet, Take 1 tablet by mouth Daily., Disp: , Rfl:     clonazePAM (KlonoPIN) 1 MG tablet, Take 1 tablet by mouth At Night As Needed for Seizures., Disp: , Rfl:     Cranberry Juice Powder 425 MG capsule, Daily., Disp: , Rfl:     D-Mannose 500 MG capsule, Take  by mouth., Disp: , Rfl:     dexlansoprazole (DEXILANT) 60 MG capsule, Take 1 capsule by mouth Daily., Disp: , Rfl:     DULoxetine (CYMBALTA) 60 MG capsule, , Disp: , Rfl:     estradiol (ESTRACE) 0.1 MG/GM vaginal cream, , Disp: , Rfl:     furosemide (LASIX) 20 MG tablet, Take 1 tablet by mouth As Needed., Disp: , Rfl:     HYDROcodone-acetaminophen (NORCO) 7.5-325 MG per tablet, AT BEDTIME,  Disp: , Rfl:     levothyroxine (SYNTHROID, LEVOTHROID) 100 MCG tablet, Take 1 tablet by mouth Daily., Disp: , Rfl:     loratadine (CLARITIN) 10 MG tablet, Take 1 tablet by mouth Daily., Disp: , Rfl:     methenamine (HIPREX) 1 g tablet, Take 1 tablet by mouth Daily., Disp: 90 tablet, Rfl: 3    metoprolol tartrate (LOPRESSOR) 25 MG tablet, Take 1 tablet by mouth 2 (Two) Times a Day., Disp: , Rfl:     Multiple Vitamins-Minerals (MULTIVITAMIN WITH MINERALS) tablet tablet, Take 1 tablet by mouth Daily., Disp: , Rfl:     nitrofurantoin (MACRODANTIN) 50 MG capsule, 1 capsule. (Patient not taking: Reported on 6/10/2025), Disp: , Rfl:     oxybutynin (DITROPAN) 5 MG tablet, Take 1 tablet by mouth 3 (Three) Times a Day., Disp: 30 tablet, Rfl: 2    phenazopyridine (PYRIDIUM) 100 MG tablet, Take 1 tablet by mouth 3 (Three) Times a Day As Needed for Bladder Spasms., Disp: 12 tablet, Rfl: 0    phenazopyridine (PYRIDIUM) 100 MG tablet, Take 1 tablet by mouth 3 (Three) Times a Day As Needed for Bladder Spasms., Disp: 20 tablet, Rfl: 1  Past Medical History:   Diagnosis Date    COPD (chronic obstructive pulmonary disease)     Hypertension     Thyroid disease      Past Surgical History:   Procedure Laterality Date    CATARACT EXTRACTION Bilateral     DILATATION AND CURETTAGE      x 2    ILEOSTOMY      REFRACTIVE SURGERY Bilateral     STOMA DILATATION  05/2025           Review of Systems   Constitutional:  Negative for chills and fever.   Gastrointestinal:  Negative for abdominal pain, anal bleeding and blood in stool.   Genitourinary:  Positive for dysuria, frequency, pelvic pain and urgency. Negative for hematuria.         Objective   PHYSICAL EXAM  Vital Signs:   There were no vitals taken for this visit.    Physical Exam  Constitutional:      ] Well developed, well nourished, no distress  Respiratory:   Effort unlabored; Movements symmetric  GI:   No mass or hernia noted, not distended or tender   No enlargement of spleen or liver  noted  Skin:   No pallor or cyanosis; No obvious rash  Psych:   Alert, Oriented x 3         DATA  Result Review :              Results for orders placed or performed in visit on 06/16/25   Urine Culture - Urine, Urine, Catheter In/Out    Collection Time: 06/16/25  2:25 PM    Specimen: Urine, Catheter In/Out   Result Value Ref Range    Urine Culture No growth        CT abdomen pelvis w wo contrast (06/24/2025 00:00)     STUDY: CT ABD/PELVIS WITH & W/OUT  DATE OF VISIT: 06/24/2025    History:  Recurrent UTIs, CT urography.  History of ileostomy.    Dose:  978 mGy-cm.  Automatic exposure control was utilized    Report:  Spiral CT of the abdomen and pelvis was performed with without  intravenous contrast using CT urography protocol, which includes delayed  images.  Reconstructed coronal and sagittal images are also reviewed.    Comparison:  None.    There is mild hyperinflation of the lung bases, with mild subpleural  scarring and/or atelectasis, greatest dependently on the right and in the  inferior lingula.  In the right middle lobe, there is a small 5 mm nodule  which is probably benign.    A small pericardial effusion is present.  Noncontrast images demonstrate  scattered calcified granulomas in the spleen with normal spleen size.  No  nephrolithiasis or ureterolithiasis is identified.  Contrast-enhanced  images demonstrate normal enhancement of both kidneys, no renal mass or  hydronephrosis is identified.  There is a tiny cyst at the inferior pole  cortex on the left that measures less than 1 cm.  This appears benign.  There is circumferential wall thickening involving the bladder, with  incomplete bladder distention.  The bladder wall thickening somewhat  asymmetric greater towards the left and at the bladder base.  Delayed  images demonstrate opacification of the urinary bladder, with continued  bladder wall thickening.  This may be associated with acute cystitis,  though with the asymmetric bladder wall  thickening, follow-up is recommend  to exclude bladder neoplasm.  The uterus is enlarged and contains multiple  small calcified masses compatible with fibroids.  There is extensive streak  artifact in the presacral space associated with surgical clips.    There is masslike fullness in the presacral space, which is mostly obscured  by streak artifact.  This is difficult to separate from the posterior  margin of the enlarged uterus and has a transverse diameter of  approximately 3.1 cm.  An exophytic fibroid is possible, however the mass  associated with the upper rectum can not be excluded.  There is evidence of  previous colectomy, an ileostomy is present in the right lower quadrant.    The gallbladder is contracted.  The liver is homogeneous without evidence  of a mass.  The spleen is unremarkable.  The stomach is decompressed.  The  pancreas and adrenal glands are within normal limits.  No intra-abdominal  lymphadenopathy is identified.  Calcified plaque is noted within the distal  aorta and iliac arteries.  Review of bone windows shows no acute osseous  abnormality, no evidence of metastatic disease.    Impression: 1.  Bladder wall thickening appears somewhat asymmetric and  greater on the left and at the bladder base, although this may be related  to cystitis, bladder neoplasm should be excluded.  Follow-up with urology  is suggested.  2.  Evidence of previous colectomy with a right lower  quadrant ileostomy.  There is soft tissue fullness in the presacral space  measuring in the range of 3.1 cm.  This is partially obscured by streak  artifact from multiple surgical clips unfortunately.  Fibroids are present  and this could represent an exophytic fibroid, however neoplasm in the  presacral space can not be excluded.  Comparison with previous CT exams  would be helpful, if available.  If there is a history of neoplasm, PET-CT  would be the most sensitive study for follow-up.    Smith bonilla DT:06/24/25  09:12      I have looked at these images myself.  There is a presacral mass that is difficult to see due to streak artifact from prior surgery but is of concern.  There is no hydronephrosis.  Bladder wall does look thickened but there is also a little air that is on top of the bladder.  I would suspect there to be more if there were really a fistula/DLS       ASSESSMENT AND PLAN          Problem List Items Addressed This Visit          Genitourinary and Reproductive     Lesion of bladder    Relevant Orders    Case Request (Completed)     Other Visit Diagnoses         Bladder wall thickening    -  Primary      Urge incontinence              This erythema that we see in the bladder is worrisome but also question if it could just be inflammatory, possibly from some type of fistula.  The debris in the bladder is certainly not typical.  I do think a cystoscopy under anesthetic and biopsy of this area is necessary.  I certainly would want to resect this and if it turned out to be a urothelial malignancy would likely would have to do this in 2 stages. She is going to need a general surgery referral        FOLLOW UP     No follow-ups on file.        (Please note that portions of this note were completed with a voice recognition program.)  Tanner Mi MD  07/18/25  14:34 CDT

## 2025-07-18 NOTE — PROGRESS NOTES
Chief Complaint  Bladder lesion    Subjective          Merna Bauer presents to [facility] UROLOGY   This patient's had persistent dysuria with urgency and frequency and suprapubic pain for quite some time.  She says she even hurts when she sits down.  While UTI has been diagnosed and treated with antibiotics this has not relieved the symptoms.  Patient also has a history of ulcerative colitis and currently has an ileostomy.  She says she has not had any trouble with this.        Current Outpatient Medications:     ACIDOPHILUS LACTOBACILLUS PO, Take  by mouth., Disp: , Rfl:     amitriptyline (ELAVIL) 25 MG tablet, Take 1 tablet by mouth every night at bedtime., Disp: , Rfl:     amLODIPine (NORVASC) 10 MG tablet, , Disp: , Rfl:     aspirin 81 MG EC tablet, Take 1 tablet by mouth Daily., Disp: , Rfl:     atorvastatin (LIPITOR) 20 MG tablet, Take 1 tablet by mouth Daily., Disp: , Rfl:     busPIRone (BUSPAR) 10 MG tablet, Take 1 tablet by mouth 2 (Two) Times a Day., Disp: , Rfl:     Calcium Carbonate (Super Calcium) 1500 (600 Ca) MG tablet, Every 12 (Twelve) Hours., Disp: , Rfl:     cefdinir (OMNICEF) 300 MG capsule, Take 1 capsule by mouth 2 (Two) Times a Day. (Patient not taking: Reported on 6/10/2025), Disp: 20 capsule, Rfl: 0    citalopram (CeleXA) 40 MG tablet, Take 1 tablet by mouth Daily., Disp: , Rfl:     clonazePAM (KlonoPIN) 1 MG tablet, Take 1 tablet by mouth At Night As Needed for Seizures., Disp: , Rfl:     Cranberry Juice Powder 425 MG capsule, Daily., Disp: , Rfl:     D-Mannose 500 MG capsule, Take  by mouth., Disp: , Rfl:     dexlansoprazole (DEXILANT) 60 MG capsule, Take 1 capsule by mouth Daily., Disp: , Rfl:     DULoxetine (CYMBALTA) 60 MG capsule, , Disp: , Rfl:     estradiol (ESTRACE) 0.1 MG/GM vaginal cream, , Disp: , Rfl:     furosemide (LASIX) 20 MG tablet, Take 1 tablet by mouth As Needed., Disp: , Rfl:     HYDROcodone-acetaminophen (NORCO) 7.5-325 MG per tablet, AT BEDTIME,  Disp: , Rfl:     levothyroxine (SYNTHROID, LEVOTHROID) 100 MCG tablet, Take 1 tablet by mouth Daily., Disp: , Rfl:     loratadine (CLARITIN) 10 MG tablet, Take 1 tablet by mouth Daily., Disp: , Rfl:     methenamine (HIPREX) 1 g tablet, Take 1 tablet by mouth Daily., Disp: 90 tablet, Rfl: 3    metoprolol tartrate (LOPRESSOR) 25 MG tablet, Take 1 tablet by mouth 2 (Two) Times a Day., Disp: , Rfl:     Multiple Vitamins-Minerals (MULTIVITAMIN WITH MINERALS) tablet tablet, Take 1 tablet by mouth Daily., Disp: , Rfl:     nitrofurantoin (MACRODANTIN) 50 MG capsule, 1 capsule. (Patient not taking: Reported on 6/10/2025), Disp: , Rfl:     oxybutynin (DITROPAN) 5 MG tablet, Take 1 tablet by mouth 3 (Three) Times a Day., Disp: 30 tablet, Rfl: 2    phenazopyridine (PYRIDIUM) 100 MG tablet, Take 1 tablet by mouth 3 (Three) Times a Day As Needed for Bladder Spasms., Disp: 12 tablet, Rfl: 0    phenazopyridine (PYRIDIUM) 100 MG tablet, Take 1 tablet by mouth 3 (Three) Times a Day As Needed for Bladder Spasms., Disp: 20 tablet, Rfl: 1  Past Medical History:   Diagnosis Date    COPD (chronic obstructive pulmonary disease)     Hypertension     Thyroid disease      Past Surgical History:   Procedure Laterality Date    CATARACT EXTRACTION Bilateral     DILATATION AND CURETTAGE      x 2    ILEOSTOMY      REFRACTIVE SURGERY Bilateral     STOMA DILATATION  05/2025           Review of Systems   Constitutional:  Negative for chills and fever.   Gastrointestinal:  Negative for abdominal pain, anal bleeding and blood in stool.   Genitourinary:  Positive for dysuria, frequency, pelvic pain and urgency. Negative for hematuria.         Objective   PHYSICAL EXAM  Vital Signs:   There were no vitals taken for this visit.    Physical Exam  Constitutional:      ] Well developed, well nourished, no distress  Respiratory:   Effort unlabored; Movements symmetric  GI:   No mass or hernia noted, not distended or tender   No enlargement of spleen or liver  noted  Skin:   No pallor or cyanosis; No obvious rash  Psych:   Alert, Oriented x 3         DATA  Result Review :              Results for orders placed or performed in visit on 06/16/25   Urine Culture - Urine, Urine, Catheter In/Out    Collection Time: 06/16/25  2:25 PM    Specimen: Urine, Catheter In/Out   Result Value Ref Range    Urine Culture No growth        CT abdomen pelvis w wo contrast (06/24/2025 00:00)     STUDY: CT ABD/PELVIS WITH & W/OUT  DATE OF VISIT: 06/24/2025    History:  Recurrent UTIs, CT urography.  History of ileostomy.    Dose:  978 mGy-cm.  Automatic exposure control was utilized    Report:  Spiral CT of the abdomen and pelvis was performed with without  intravenous contrast using CT urography protocol, which includes delayed  images.  Reconstructed coronal and sagittal images are also reviewed.    Comparison:  None.    There is mild hyperinflation of the lung bases, with mild subpleural  scarring and/or atelectasis, greatest dependently on the right and in the  inferior lingula.  In the right middle lobe, there is a small 5 mm nodule  which is probably benign.    A small pericardial effusion is present.  Noncontrast images demonstrate  scattered calcified granulomas in the spleen with normal spleen size.  No  nephrolithiasis or ureterolithiasis is identified.  Contrast-enhanced  images demonstrate normal enhancement of both kidneys, no renal mass or  hydronephrosis is identified.  There is a tiny cyst at the inferior pole  cortex on the left that measures less than 1 cm.  This appears benign.  There is circumferential wall thickening involving the bladder, with  incomplete bladder distention.  The bladder wall thickening somewhat  asymmetric greater towards the left and at the bladder base.  Delayed  images demonstrate opacification of the urinary bladder, with continued  bladder wall thickening.  This may be associated with acute cystitis,  though with the asymmetric bladder wall  thickening, follow-up is recommend  to exclude bladder neoplasm.  The uterus is enlarged and contains multiple  small calcified masses compatible with fibroids.  There is extensive streak  artifact in the presacral space associated with surgical clips.    There is masslike fullness in the presacral space, which is mostly obscured  by streak artifact.  This is difficult to separate from the posterior  margin of the enlarged uterus and has a transverse diameter of  approximately 3.1 cm.  An exophytic fibroid is possible, however the mass  associated with the upper rectum can not be excluded.  There is evidence of  previous colectomy, an ileostomy is present in the right lower quadrant.    The gallbladder is contracted.  The liver is homogeneous without evidence  of a mass.  The spleen is unremarkable.  The stomach is decompressed.  The  pancreas and adrenal glands are within normal limits.  No intra-abdominal  lymphadenopathy is identified.  Calcified plaque is noted within the distal  aorta and iliac arteries.  Review of bone windows shows no acute osseous  abnormality, no evidence of metastatic disease.    Impression: 1.  Bladder wall thickening appears somewhat asymmetric and  greater on the left and at the bladder base, although this may be related  to cystitis, bladder neoplasm should be excluded.  Follow-up with urology  is suggested.  2.  Evidence of previous colectomy with a right lower  quadrant ileostomy.  There is soft tissue fullness in the presacral space  measuring in the range of 3.1 cm.  This is partially obscured by streak  artifact from multiple surgical clips unfortunately.  Fibroids are present  and this could represent an exophytic fibroid, however neoplasm in the  presacral space can not be excluded.  Comparison with previous CT exams  would be helpful, if available.  If there is a history of neoplasm, PET-CT  would be the most sensitive study for follow-up.    Smith bonilla DT:06/24/25  09:12      I have looked at these images myself.  There is a presacral mass that is difficult to see due to streak artifact from prior surgery but is of concern.  There is no hydronephrosis.  Bladder wall does look thickened but there is also a little air that is on top of the bladder.  I would suspect there to be more if there were really a fistula/DLS       ASSESSMENT AND PLAN          Problem List Items Addressed This Visit          Genitourinary and Reproductive     Lesion of bladder    Relevant Orders    Case Request (Completed)     Other Visit Diagnoses         Bladder wall thickening    -  Primary      Urge incontinence              This erythema that we see in the bladder is worrisome but also question if it could just be inflammatory, possibly from some type of fistula.  The debris in the bladder is certainly not typical.  I do think a cystoscopy under anesthetic and biopsy of this area is necessary.  I certainly would want to resect this and if it turned out to be a urothelial malignancy would likely would have to do this in 2 stages. She is going to need a general surgery referral        FOLLOW UP     No follow-ups on file.        (Please note that portions of this note were completed with a voice recognition program.)  Tanner Mi MD  07/18/25  14:34 CDT

## 2025-07-21 ENCOUNTER — PRE-ADMISSION TESTING (OUTPATIENT)
Dept: PREADMISSION TESTING | Facility: HOSPITAL | Age: 80
End: 2025-07-21
Payer: MEDICARE

## 2025-07-21 ENCOUNTER — TELEPHONE (OUTPATIENT)
Dept: UROLOGY | Facility: CLINIC | Age: 80
End: 2025-07-21
Payer: MEDICARE

## 2025-07-21 VITALS
WEIGHT: 130.29 LBS | HEIGHT: 63 IN | OXYGEN SATURATION: 97 % | BODY MASS INDEX: 23.09 KG/M2 | RESPIRATION RATE: 18 BRPM | SYSTOLIC BLOOD PRESSURE: 157 MMHG | DIASTOLIC BLOOD PRESSURE: 61 MMHG | HEART RATE: 67 BPM

## 2025-07-21 LAB
ANION GAP SERPL CALCULATED.3IONS-SCNC: 12 MMOL/L (ref 5–15)
BUN SERPL-MCNC: 16.9 MG/DL (ref 8–23)
BUN/CREAT SERPL: 23.2 (ref 7–25)
CALCIUM SPEC-SCNC: 10.1 MG/DL (ref 8.6–10.5)
CHLORIDE SERPL-SCNC: 107 MMOL/L (ref 98–107)
CO2 SERPL-SCNC: 24 MMOL/L (ref 22–29)
CREAT SERPL-MCNC: 0.73 MG/DL (ref 0.57–1)
DEPRECATED RDW RBC AUTO: 44.5 FL (ref 37–54)
EGFRCR SERPLBLD CKD-EPI 2021: 83.3 ML/MIN/1.73
ERYTHROCYTE [DISTWIDTH] IN BLOOD BY AUTOMATED COUNT: 12.4 % (ref 12.3–15.4)
GLUCOSE SERPL-MCNC: 119 MG/DL (ref 65–99)
HCT VFR BLD AUTO: 43.9 % (ref 34–46.6)
HGB BLD-MCNC: 13.9 G/DL (ref 12–15.9)
MCH RBC QN AUTO: 30.5 PG (ref 26.6–33)
MCHC RBC AUTO-ENTMCNC: 31.7 G/DL (ref 31.5–35.7)
MCV RBC AUTO: 96.3 FL (ref 79–97)
PLATELET # BLD AUTO: 245 10*3/MM3 (ref 140–450)
PMV BLD AUTO: 10.2 FL (ref 6–12)
POTASSIUM SERPL-SCNC: 4.8 MMOL/L (ref 3.5–5.2)
RBC # BLD AUTO: 4.56 10*6/MM3 (ref 3.77–5.28)
SODIUM SERPL-SCNC: 143 MMOL/L (ref 136–145)
WBC NRBC COR # BLD AUTO: 7.79 10*3/MM3 (ref 3.4–10.8)

## 2025-07-21 PROCEDURE — 85027 COMPLETE CBC AUTOMATED: CPT

## 2025-07-21 PROCEDURE — 80048 BASIC METABOLIC PNL TOTAL CA: CPT

## 2025-07-21 PROCEDURE — 36415 COLL VENOUS BLD VENIPUNCTURE: CPT

## 2025-07-21 NOTE — TELEPHONE ENCOUNTER
Called pt to remind her about blood thinner leading up to her procedure. No answer, could not leave message.

## 2025-07-21 NOTE — DISCHARGE INSTRUCTIONS

## 2025-07-28 ENCOUNTER — TELEPHONE (OUTPATIENT)
Dept: UROLOGY | Facility: CLINIC | Age: 80
End: 2025-07-28
Payer: MEDICARE

## 2025-07-28 NOTE — TELEPHONE ENCOUNTER
Called patient to remind them to arrive at patient registration on 7/30/2025 at 5:30 am for the procedure with Dr. Mi. Spoke with patient.  Patient was informed that the surgery schedule fluctuates so there is no given start time. And to be prepared to be here all day. Told patient if they had any questions to please contact our office at [phone].

## 2025-07-30 ENCOUNTER — APPOINTMENT (OUTPATIENT)
Dept: GENERAL RADIOLOGY | Facility: HOSPITAL | Age: 80
End: 2025-07-30
Payer: MEDICARE

## 2025-07-30 ENCOUNTER — ANESTHESIA (OUTPATIENT)
Dept: PERIOP | Facility: HOSPITAL | Age: 80
End: 2025-07-30
Payer: MEDICARE

## 2025-07-30 ENCOUNTER — ANESTHESIA EVENT (OUTPATIENT)
Dept: PERIOP | Facility: HOSPITAL | Age: 80
End: 2025-07-30
Payer: MEDICARE

## 2025-07-30 ENCOUNTER — HOSPITAL ENCOUNTER (OUTPATIENT)
Facility: HOSPITAL | Age: 80
Setting detail: HOSPITAL OUTPATIENT SURGERY
Discharge: HOME | End: 2025-07-30
Attending: UROLOGY | Admitting: UROLOGY
Payer: MEDICARE

## 2025-07-30 VITALS
DIASTOLIC BLOOD PRESSURE: 49 MMHG | TEMPERATURE: 98.4 F | SYSTOLIC BLOOD PRESSURE: 146 MMHG | HEART RATE: 70 BPM | OXYGEN SATURATION: 96 % | RESPIRATION RATE: 16 BRPM

## 2025-07-30 DIAGNOSIS — N32.9 LESION OF BLADDER: ICD-10-CM

## 2025-07-30 DIAGNOSIS — N39.0 RECURRENT UTI: ICD-10-CM

## 2025-07-30 PROCEDURE — 88307 TISSUE EXAM BY PATHOLOGIST: CPT | Performed by: UROLOGY

## 2025-07-30 PROCEDURE — 25010000002 SUGAMMADEX 200 MG/2ML SOLUTION: Performed by: NURSE ANESTHETIST, CERTIFIED REGISTERED

## 2025-07-30 PROCEDURE — 25010000002 LIDOCAINE PF 2% 2 % SOLUTION: Performed by: NURSE ANESTHETIST, CERTIFIED REGISTERED

## 2025-07-30 PROCEDURE — 52240 CYSTOSCOPY AND TREATMENT: CPT | Performed by: UROLOGY

## 2025-07-30 PROCEDURE — 25010000002 PROPOFOL 10 MG/ML EMULSION: Performed by: NURSE ANESTHETIST, CERTIFIED REGISTERED

## 2025-07-30 PROCEDURE — 25810000003 LACTATED RINGERS PER 1000 ML: Performed by: UROLOGY

## 2025-07-30 PROCEDURE — 25010000002 DEXAMETHASONE PER 1 MG: Performed by: NURSE ANESTHETIST, CERTIFIED REGISTERED

## 2025-07-30 PROCEDURE — 25010000002 CEFTRIAXONE PER 250 MG: Performed by: UROLOGY

## 2025-07-30 PROCEDURE — 25010000002 ONDANSETRON PER 1 MG: Performed by: NURSE ANESTHETIST, CERTIFIED REGISTERED

## 2025-07-30 PROCEDURE — 25010000002 FENTANYL CITRATE (PF) 100 MCG/2ML SOLUTION: Performed by: NURSE ANESTHETIST, CERTIFIED REGISTERED

## 2025-07-30 RX ORDER — NALOXONE HCL 0.4 MG/ML
0.4 VIAL (ML) INJECTION AS NEEDED
Status: DISCONTINUED | OUTPATIENT
Start: 2025-07-30 | End: 2025-07-30 | Stop reason: HOSPADM

## 2025-07-30 RX ORDER — LIDOCAINE HYDROCHLORIDE 10 MG/ML
0.5 INJECTION, SOLUTION EPIDURAL; INFILTRATION; INTRACAUDAL; PERINEURAL ONCE AS NEEDED
Status: DISCONTINUED | OUTPATIENT
Start: 2025-07-30 | End: 2025-07-30 | Stop reason: HOSPADM

## 2025-07-30 RX ORDER — LABETALOL HYDROCHLORIDE 5 MG/ML
5 INJECTION, SOLUTION INTRAVENOUS
Status: DISCONTINUED | OUTPATIENT
Start: 2025-07-30 | End: 2025-07-30 | Stop reason: HOSPADM

## 2025-07-30 RX ORDER — FLUMAZENIL 0.1 MG/ML
0.2 INJECTION INTRAVENOUS AS NEEDED
Status: DISCONTINUED | OUTPATIENT
Start: 2025-07-30 | End: 2025-07-30 | Stop reason: HOSPADM

## 2025-07-30 RX ORDER — HYDROCODONE BITARTRATE AND ACETAMINOPHEN 10; 325 MG/1; MG/1
1 TABLET ORAL EVERY 4 HOURS PRN
Status: DISCONTINUED | OUTPATIENT
Start: 2025-07-30 | End: 2025-07-30 | Stop reason: HOSPADM

## 2025-07-30 RX ORDER — SODIUM CHLORIDE 0.9 % (FLUSH) 0.9 %
3-10 SYRINGE (ML) INJECTION AS NEEDED
Status: DISCONTINUED | OUTPATIENT
Start: 2025-07-30 | End: 2025-07-30 | Stop reason: HOSPADM

## 2025-07-30 RX ORDER — SODIUM CHLORIDE 0.9 % (FLUSH) 0.9 %
3 SYRINGE (ML) INJECTION AS NEEDED
Status: DISCONTINUED | OUTPATIENT
Start: 2025-07-30 | End: 2025-07-30 | Stop reason: HOSPADM

## 2025-07-30 RX ORDER — FENTANYL CITRATE 50 UG/ML
50 INJECTION, SOLUTION INTRAMUSCULAR; INTRAVENOUS
Status: DISCONTINUED | OUTPATIENT
Start: 2025-07-30 | End: 2025-07-30 | Stop reason: HOSPADM

## 2025-07-30 RX ORDER — SODIUM CHLORIDE 9 MG/ML
40 INJECTION, SOLUTION INTRAVENOUS AS NEEDED
Status: DISCONTINUED | OUTPATIENT
Start: 2025-07-30 | End: 2025-07-30 | Stop reason: HOSPADM

## 2025-07-30 RX ORDER — SODIUM CHLORIDE, SODIUM LACTATE, POTASSIUM CHLORIDE, CALCIUM CHLORIDE 600; 310; 30; 20 MG/100ML; MG/100ML; MG/100ML; MG/100ML
1000 INJECTION, SOLUTION INTRAVENOUS CONTINUOUS
Status: DISCONTINUED | OUTPATIENT
Start: 2025-07-30 | End: 2025-07-30 | Stop reason: HOSPADM

## 2025-07-30 RX ORDER — HYDROCODONE BITARTRATE AND ACETAMINOPHEN 5; 325 MG/1; MG/1
1 TABLET ORAL EVERY 4 HOURS PRN
Status: DISCONTINUED | OUTPATIENT
Start: 2025-07-30 | End: 2025-07-30 | Stop reason: HOSPADM

## 2025-07-30 RX ORDER — SODIUM CHLORIDE 0.9 % (FLUSH) 0.9 %
3 SYRINGE (ML) INJECTION EVERY 12 HOURS SCHEDULED
Status: DISCONTINUED | OUTPATIENT
Start: 2025-07-30 | End: 2025-07-30 | Stop reason: HOSPADM

## 2025-07-30 RX ORDER — FENTANYL CITRATE 50 UG/ML
INJECTION, SOLUTION INTRAMUSCULAR; INTRAVENOUS AS NEEDED
Status: DISCONTINUED | OUTPATIENT
Start: 2025-07-30 | End: 2025-07-30 | Stop reason: SURG

## 2025-07-30 RX ORDER — HYDROCODONE BITARTRATE AND ACETAMINOPHEN 5; 325 MG/1; MG/1
1 TABLET ORAL EVERY 4 HOURS PRN
Qty: 16 TABLET | Refills: 0 | Status: SHIPPED | OUTPATIENT
Start: 2025-07-30 | End: 2025-08-04

## 2025-07-30 RX ORDER — CEFTRIAXONE 1 G/1
1 INJECTION, POWDER, FOR SOLUTION INTRAMUSCULAR; INTRAVENOUS ONCE
Status: DISCONTINUED | OUTPATIENT
Start: 2025-07-30 | End: 2025-07-30

## 2025-07-30 RX ORDER — PHENAZOPYRIDINE HYDROCHLORIDE 100 MG/1
100 TABLET, FILM COATED ORAL 3 TIMES DAILY PRN
Qty: 12 TABLET | Refills: 0 | Status: SHIPPED | OUTPATIENT
Start: 2025-07-30

## 2025-07-30 RX ORDER — ONDANSETRON 2 MG/ML
4 INJECTION INTRAMUSCULAR; INTRAVENOUS ONCE AS NEEDED
Status: DISCONTINUED | OUTPATIENT
Start: 2025-07-30 | End: 2025-07-30 | Stop reason: HOSPADM

## 2025-07-30 RX ORDER — ONDANSETRON 2 MG/ML
INJECTION INTRAMUSCULAR; INTRAVENOUS AS NEEDED
Status: DISCONTINUED | OUTPATIENT
Start: 2025-07-30 | End: 2025-07-30 | Stop reason: SURG

## 2025-07-30 RX ORDER — PROPOFOL 10 MG/ML
VIAL (ML) INTRAVENOUS AS NEEDED
Status: DISCONTINUED | OUTPATIENT
Start: 2025-07-30 | End: 2025-07-30 | Stop reason: SURG

## 2025-07-30 RX ORDER — ROCURONIUM BROMIDE 10 MG/ML
INJECTION, SOLUTION INTRAVENOUS AS NEEDED
Status: DISCONTINUED | OUTPATIENT
Start: 2025-07-30 | End: 2025-07-30 | Stop reason: SURG

## 2025-07-30 RX ORDER — LIDOCAINE HYDROCHLORIDE 20 MG/ML
INJECTION, SOLUTION EPIDURAL; INFILTRATION; INTRACAUDAL; PERINEURAL AS NEEDED
Status: DISCONTINUED | OUTPATIENT
Start: 2025-07-30 | End: 2025-07-30 | Stop reason: SURG

## 2025-07-30 RX ORDER — DEXAMETHASONE SODIUM PHOSPHATE 4 MG/ML
INJECTION, SOLUTION INTRA-ARTICULAR; INTRALESIONAL; INTRAMUSCULAR; INTRAVENOUS; SOFT TISSUE AS NEEDED
Status: DISCONTINUED | OUTPATIENT
Start: 2025-07-30 | End: 2025-07-30 | Stop reason: SURG

## 2025-07-30 RX ORDER — SORBITOL 30 G/1000ML
IRRIGANT IRRIGATION AS NEEDED
Status: DISCONTINUED | OUTPATIENT
Start: 2025-07-30 | End: 2025-07-30 | Stop reason: HOSPADM

## 2025-07-30 RX ORDER — SODIUM CHLORIDE, SODIUM LACTATE, POTASSIUM CHLORIDE, CALCIUM CHLORIDE 600; 310; 30; 20 MG/100ML; MG/100ML; MG/100ML; MG/100ML
100 INJECTION, SOLUTION INTRAVENOUS CONTINUOUS
Status: DISCONTINUED | OUTPATIENT
Start: 2025-07-30 | End: 2025-07-30 | Stop reason: HOSPADM

## 2025-07-30 RX ORDER — ACETAMINOPHEN 500 MG
1000 TABLET ORAL ONCE
Status: COMPLETED | OUTPATIENT
Start: 2025-07-30 | End: 2025-07-30

## 2025-07-30 RX ORDER — SUCCINYLCHOLINE/SOD CL,ISO/PF 200MG/10ML
SYRINGE (ML) INTRAVENOUS AS NEEDED
Status: DISCONTINUED | OUTPATIENT
Start: 2025-07-30 | End: 2025-07-30 | Stop reason: SURG

## 2025-07-30 RX ADMIN — FENTANYL CITRATE 50 MCG: 50 INJECTION, SOLUTION INTRAMUSCULAR; INTRAVENOUS at 09:31

## 2025-07-30 RX ADMIN — DEXAMETHASONE SODIUM PHOSPHATE 4 MG: 4 INJECTION, SOLUTION INTRA-ARTICULAR; INTRALESIONAL; INTRAMUSCULAR; INTRAVENOUS; SOFT TISSUE at 09:11

## 2025-07-30 RX ADMIN — LIDOCAINE HYDROCHLORIDE 100 MG: 20 INJECTION, SOLUTION EPIDURAL; INFILTRATION; INTRACAUDAL; PERINEURAL at 08:56

## 2025-07-30 RX ADMIN — ACETAMINOPHEN 1000 MG: 500 TABLET, FILM COATED ORAL at 08:47

## 2025-07-30 RX ADMIN — SODIUM CHLORIDE, POTASSIUM CHLORIDE, SODIUM LACTATE AND CALCIUM CHLORIDE 1000 ML: 600; 310; 30; 20 INJECTION, SOLUTION INTRAVENOUS at 07:32

## 2025-07-30 RX ADMIN — PROPOFOL 160 MG: 10 INJECTION, EMULSION INTRAVENOUS at 08:56

## 2025-07-30 RX ADMIN — ONDANSETRON 4 MG: 2 INJECTION INTRAMUSCULAR; INTRAVENOUS at 09:31

## 2025-07-30 RX ADMIN — ROCURONIUM 5 MG: 50 INJECTION, SOLUTION INTRAVENOUS at 08:56

## 2025-07-30 RX ADMIN — Medication 160 MG: at 08:56

## 2025-07-30 RX ADMIN — ROCURONIUM 45 MG: 50 INJECTION, SOLUTION INTRAVENOUS at 09:01

## 2025-07-30 RX ADMIN — SODIUM CHLORIDE 1000 MG: 900 INJECTION INTRAVENOUS at 08:59

## 2025-07-30 RX ADMIN — FENTANYL CITRATE 50 MCG: 50 INJECTION, SOLUTION INTRAMUSCULAR; INTRAVENOUS at 09:43

## 2025-07-30 RX ADMIN — SUGAMMADEX 200 MG: 100 INJECTION, SOLUTION INTRAVENOUS at 09:31

## 2025-07-30 NOTE — ANESTHESIA PROCEDURE NOTES
Airway  Date/Time: 7/30/2025 8:57 AM    General Information and Staff    Patient location during procedure: OR  CRNA/CAA: Hector Nguyen CRNA    Indications and Patient Condition  Indications for airway management: airway protection    Preoxygenated: yes    Mask difficulty assessment: 1 - vent by mask    Final Airway Details    Final airway type: endotracheal airway      Successful airway: ETT  Cuffed: yes   Successful intubation technique: direct laryngoscopy  Endotracheal tube insertion site: oral  Blade: Reagan  Blade size: 3.5  ETT size (mm): 7.5  Cormack-Lehane Classification: grade I - full view of glottis  Placement verified by: capnometry   Cuff volume (mL): 6  Measured from: lips  ETT/EBT  to lips (cm): 22  Number of attempts at approach: 1  Assessment: lips, teeth, and gum same as pre-op and atraumatic intubation

## 2025-07-30 NOTE — OP NOTE
Operative Summary    Merna Bauer  Date of Procedure: 7/30/2025    Pre-op Diagnosis:   Lesion of bladder [N32.9]     Post-op Diagnosis:     Post-Op Diagnosis Codes:     * Lesion of bladder [N32.9]    Procedure/CPT® Codes:  VA CYSTO W/REMOVAL OF LESIONS SMALL [17212]    Procedure(s):  CYSTOSCOPY BLADDER BIOPSY/CAUTERIZATION/RESECTION ERYTHEMATOUS BLADDER LESIONS (largest  6x5cm) WITH FULGURATION    Surgeon(s):  Tanner Mi MD    Anesthesia: General    Staff:   Circulator: Avi Cedeno RN; Ann Campuzano RN  Scrub Person: Maria Del Carmen Valdez; Ginger George; Steve Castellanos; Susan Gerardo  Orientee: Amanda Jansen RN    Indications for procedure:  Patient with intractable irritative voiding symptoms and 2 areas of bladder erythema    Findings:   Patient basically has 2 areas of bladder erythema.  The first is a 6 x 5 cm area over the right posterior lateral aspect of the bladder that supratrigonal.  The other is a 3 x 4 cm area that appears similar on the left posterior lateral wall.  It again is supratrigonal.  There are no other lesions.    Procedure details:  After appropriate anesthesia, positioning, prep and drape, timeout protocol was observed.     A 23 Upper sorbian cystoscope sheath with a 30 degree lens is inserted.  I used both 30 and 70 degree lens to inspect the bladder.  The findings are described above.  No other lesions are identified.  The orifices are orthotopic in position and normal in their configuration.    I used a Taughber forceps to do a couple biopsies but this caused such significant bleeding even with attempts to use cautery that I had to switch over to the resectoscope.  A 27 Upper sorbian Youngblood resectoscope was inserted.  Using a cutting loop I resected these lesions and fulgurated them as well.  He had a sampling of this to rule out carcinoma in situ but I did go ahead and fulgurate both of these areas.    Evacuator is used to remove the chips from the bladder.  Three-way Sommer cath was  placed and she started on continuous bladder irrigation    Estimated Blood Loss: Less than 30 mL    Specimens:                Specimens       ID Source Type Tests Collected By Collected At Frozen?    A Urinary Bladder Tissue TISSUE PATHOLOGY EXAM   Tanner Mi MD 7/30/25 0936     Description: BLADDER ERYTHEMA POSTERIOR BLADDER              Drains: 20 Turkish three-way Sommer cath    Complications: none    Plan: Await patient's pathology results.  I suspect they are going to come back as some type of inflammatory cystitis.      (Please note that portions of this note were completed with a voice recognition program.)  Tanner Mi MD     Date: 7/30/2025  Time: 09:47 CDT

## 2025-07-30 NOTE — ANESTHESIA POSTPROCEDURE EVALUATION
Patient: Merna Bauer    Procedure Summary       Date: 07/30/25 Room / Location:  [facility] OR  /  [facility] OR    Anesthesia Start: 0852 Anesthesia Stop: 0950    Procedure: CYSTOSCOPY BLADDER BIOPSY/CAUTERIZATION BLADDER LESION WITH FULGURATION (Bladder) Diagnosis:       Lesion of bladder      (Lesion of bladder [N32.9])    Surgeons: Tanner Mi MD Provider: Hugo Gasca CRNA    Anesthesia Type: general ASA Status: 2            Anesthesia Type: general    Vitals  Vitals Value Taken Time   /46 07/30/25 10:32   Temp 98.4 °F (36.9 °C) 07/30/25 09:48   Pulse 69 07/30/25 10:32   Resp 16 07/30/25 10:32   SpO2 95 % 07/30/25 10:32           Post Anesthesia Care and Evaluation      Comments: Discharged per PACU Criteria

## 2025-07-30 NOTE — SIGNIFICANT NOTE
Provided family and patient with detailed instructions on how to care for catheter. Pt sent home with leg bag, cath care wipes, sterile water, syringe, paper instructions on how to care for cath. Pt and family state understanding. Plugged irrigation and d/c cbi. Replaced finch cath bag with new bag 2000ml.

## 2025-07-30 NOTE — ANESTHESIA PREPROCEDURE EVALUATION
Anesthesia Evaluation     Patient summary reviewed   history of anesthetic complications:  PONV               Airway   Mallampati: II  Dental      Pulmonary    Cardiovascular   Exercise tolerance: good (4-7 METS)    (+) hypertension, valvular problems/murmurs AI  (-) pacemaker, cardiac stents, CABG      Neuro/Psych  GI/Hepatic/Renal/Endo    (+) thyroid problem     Musculoskeletal     Abdominal    Substance History      OB/GYN          Other                          Anesthesia Plan    ASA 2     general     intravenous induction     Anesthetic plan, risks, benefits, and alternatives have been provided, discussed and informed consent has been obtained with: patient.        CODE STATUS:

## 2025-07-31 ENCOUNTER — TELEPHONE (OUTPATIENT)
Dept: UROLOGY | Facility: CLINIC | Age: 80
End: 2025-07-31
Payer: MEDICARE

## 2025-07-31 NOTE — TELEPHONE ENCOUNTER
Called pt to see how she was feeling post op. Pt stated that she was doing very well. Confirmed appt for 8/7/2025 at 10:50 am for pt's cath removal. Pt expressed v/u

## 2025-08-03 LAB
CYTO UR: NORMAL
LAB AP CASE REPORT: NORMAL
Lab: NORMAL
PATH REPORT.FINAL DX SPEC: NORMAL
PATH REPORT.GROSS SPEC: NORMAL

## 2025-08-06 ENCOUNTER — RESULTS FOLLOW-UP (OUTPATIENT)
Dept: PERIOP | Facility: HOSPITAL | Age: 80
End: 2025-08-06
Payer: MEDICARE

## 2025-08-06 DIAGNOSIS — N30.30 FOLLICULAR CYSTITIS: Primary | ICD-10-CM

## 2025-08-07 ENCOUNTER — PROCEDURE VISIT (OUTPATIENT)
Dept: UROLOGY | Facility: CLINIC | Age: 80
End: 2025-08-07
Payer: MEDICARE

## 2025-08-07 DIAGNOSIS — N32.9 LESION OF BLADDER: Primary | ICD-10-CM

## 2025-08-08 ENCOUNTER — TELEPHONE (OUTPATIENT)
Dept: UROLOGY | Facility: CLINIC | Age: 80
End: 2025-08-08
Payer: MEDICARE

## 2025-08-25 ENCOUNTER — HOSPITAL ENCOUNTER (EMERGENCY)
Age: 80
Discharge: HOME | End: 2025-08-25
Attending: EMERGENCY MEDICINE
Payer: MEDICARE

## 2025-08-25 ENCOUNTER — APPOINTMENT (OUTPATIENT)
Dept: CT IMAGING | Age: 80
End: 2025-08-25
Payer: MEDICARE

## 2025-08-25 VITALS
HEART RATE: 88 BPM | OXYGEN SATURATION: 92 % | TEMPERATURE: 97.6 F | SYSTOLIC BLOOD PRESSURE: 167 MMHG | DIASTOLIC BLOOD PRESSURE: 57 MMHG | RESPIRATION RATE: 19 BRPM

## 2025-08-25 DIAGNOSIS — Z98.890 HISTORY OF ILEOSTOMY: ICD-10-CM

## 2025-08-25 DIAGNOSIS — R10.84 GENERALIZED ABDOMINAL PAIN: Primary | ICD-10-CM

## 2025-08-25 LAB
ALBUMIN SERPL-MCNC: 4.1 G/DL (ref 3.5–5.2)
ALP SERPL-CCNC: 221 U/L (ref 35–104)
ALT SERPL-CCNC: 28 U/L (ref 10–35)
ANION GAP SERPL CALCULATED.3IONS-SCNC: 15 MMOL/L (ref 8–16)
AST SERPL-CCNC: 36 U/L (ref 10–35)
BASOPHILS # BLD: 0.1 K/UL (ref 0–0.2)
BASOPHILS NFR BLD: 1.4 % (ref 0–1)
BILIRUB SERPL-MCNC: 1.2 MG/DL (ref 0.2–1.2)
BUN SERPL-MCNC: 12 MG/DL (ref 8–23)
CALCIUM SERPL-MCNC: 9.9 MG/DL (ref 8.8–10.2)
CHLORIDE SERPL-SCNC: 104 MMOL/L (ref 98–107)
CO2 SERPL-SCNC: 19 MMOL/L (ref 22–29)
CREAT SERPL-MCNC: 0.8 MG/DL (ref 0.5–0.9)
EOSINOPHIL # BLD: 0.1 K/UL (ref 0–0.6)
EOSINOPHIL NFR BLD: 2.2 % (ref 0–5)
ERYTHROCYTE [DISTWIDTH] IN BLOOD BY AUTOMATED COUNT: 13 % (ref 11.5–14.5)
GLUCOSE SERPL-MCNC: 105 MG/DL (ref 70–99)
HCT VFR BLD AUTO: 38.4 % (ref 37–47)
HGB BLD-MCNC: 12.9 G/DL (ref 12–16)
IMM GRANULOCYTES # BLD: 0 K/UL
LYMPHOCYTES # BLD: 1.7 K/UL (ref 1.1–4.5)
LYMPHOCYTES NFR BLD: 34.1 % (ref 20–40)
MCH RBC QN AUTO: 30 PG (ref 27–31)
MCHC RBC AUTO-ENTMCNC: 33.6 G/DL (ref 33–37)
MCV RBC AUTO: 89.3 FL (ref 81–99)
MONOCYTES # BLD: 0.5 K/UL (ref 0–0.9)
MONOCYTES NFR BLD: 9.2 % (ref 0–10)
NEUTROPHILS # BLD: 2.7 K/UL (ref 1.5–7.5)
NEUTS SEG NFR BLD: 52.9 % (ref 50–65)
PLATELET # BLD AUTO: 235 K/UL (ref 130–400)
PMV BLD AUTO: 9.5 FL (ref 9.4–12.3)
POTASSIUM SERPL-SCNC: 4 MMOL/L (ref 3.5–5.1)
PROT SERPL-MCNC: 6.8 G/DL (ref 6.4–8.3)
RBC # BLD AUTO: 4.3 M/UL (ref 4.2–5.4)
SODIUM SERPL-SCNC: 138 MMOL/L (ref 136–145)
WBC # BLD AUTO: 5.1 K/UL (ref 4.8–10.8)

## 2025-08-25 PROCEDURE — 80053 COMPREHEN METABOLIC PANEL: CPT

## 2025-08-25 PROCEDURE — 6360000002 HC RX W HCPCS: Performed by: EMERGENCY MEDICINE

## 2025-08-25 PROCEDURE — 6360000002 HC RX W HCPCS

## 2025-08-25 PROCEDURE — 96374 THER/PROPH/DIAG INJ IV PUSH: CPT

## 2025-08-25 PROCEDURE — 85025 COMPLETE CBC W/AUTO DIFF WBC: CPT

## 2025-08-25 PROCEDURE — 99285 EMERGENCY DEPT VISIT HI MDM: CPT

## 2025-08-25 PROCEDURE — 96375 TX/PRO/DX INJ NEW DRUG ADDON: CPT

## 2025-08-25 PROCEDURE — 36415 COLL VENOUS BLD VENIPUNCTURE: CPT

## 2025-08-25 PROCEDURE — 6360000004 HC RX CONTRAST MEDICATION: Performed by: EMERGENCY MEDICINE

## 2025-08-25 PROCEDURE — 74177 CT ABD & PELVIS W/CONTRAST: CPT

## 2025-08-25 RX ORDER — MORPHINE SULFATE 2 MG/ML
2 INJECTION, SOLUTION INTRAMUSCULAR; INTRAVENOUS ONCE
Status: COMPLETED | OUTPATIENT
Start: 2025-08-25 | End: 2025-08-25

## 2025-08-25 RX ORDER — IOPAMIDOL 755 MG/ML
70 INJECTION, SOLUTION INTRAVASCULAR
Status: COMPLETED | OUTPATIENT
Start: 2025-08-25 | End: 2025-08-25

## 2025-08-25 RX ORDER — METHENAMINE HIPPURATE 1000 MG/1
1 TABLET ORAL DAILY
COMMUNITY

## 2025-08-25 RX ORDER — PROCHLORPERAZINE EDISYLATE 5 MG/ML
10 INJECTION INTRAMUSCULAR; INTRAVENOUS ONCE
Status: COMPLETED | OUTPATIENT
Start: 2025-08-25 | End: 2025-08-25

## 2025-08-25 RX ORDER — ONDANSETRON 2 MG/ML
4 INJECTION INTRAMUSCULAR; INTRAVENOUS ONCE
Status: COMPLETED | OUTPATIENT
Start: 2025-08-25 | End: 2025-08-25

## 2025-08-25 RX ORDER — ONDANSETRON 2 MG/ML
INJECTION INTRAMUSCULAR; INTRAVENOUS
Status: COMPLETED
Start: 2025-08-25 | End: 2025-08-25

## 2025-08-25 RX ADMIN — PROCHLORPERAZINE EDISYLATE 10 MG: 5 INJECTION INTRAMUSCULAR; INTRAVENOUS at 17:20

## 2025-08-25 RX ADMIN — MORPHINE SULFATE 2 MG: 2 INJECTION, SOLUTION INTRAMUSCULAR; INTRAVENOUS at 13:16

## 2025-08-25 RX ADMIN — ONDANSETRON 4 MG: 2 INJECTION INTRAMUSCULAR; INTRAVENOUS at 14:18

## 2025-08-25 RX ADMIN — IOPAMIDOL 70 ML: 755 INJECTION, SOLUTION INTRAVENOUS at 14:09

## 2025-08-25 RX ADMIN — ONDANSETRON 4 MG: 2 INJECTION, SOLUTION INTRAMUSCULAR; INTRAVENOUS at 14:18

## 2025-08-25 ASSESSMENT — PAIN - FUNCTIONAL ASSESSMENT: PAIN_FUNCTIONAL_ASSESSMENT: 0-10

## 2025-08-25 ASSESSMENT — PAIN DESCRIPTION - LOCATION: LOCATION: ABDOMEN

## 2025-08-25 ASSESSMENT — PAIN SCALES - GENERAL: PAINLEVEL_OUTOF10: 6

## 2025-08-27 ASSESSMENT — ENCOUNTER SYMPTOMS
ABDOMINAL PAIN: 1
ABDOMINAL DISTENTION: 0
NAUSEA: 1
SHORTNESS OF BREATH: 0

## 2025-08-29 ENCOUNTER — TELEPHONE (OUTPATIENT)
Dept: OBSTETRICS AND GYNECOLOGY | Age: 80
End: 2025-08-29
Payer: MEDICARE

## (undated) DEVICE — PK CYSTO 30

## (undated) DEVICE — CUTTING ELECTRODE MONO 24FR 12/30°  FOR RESECTOSCOPES, TELESCOPE Ø 4 MM, FOR SHEATHS, INTERMITTENT/CONTINUOUS IRRIGATION, 24/26 FR, LOOP: ROUND, WIRE Ø 0.25 MM, FORK COLOR YELLOW, STEM COLOR TRANSPARENT, PACK = 10 PCS, FOR SHARK RESECTOSCOPE, STERILE, FOR SINGLE USE: Brand: SHARK

## (undated) DEVICE — HYDROGEL COATED LATEX FOLEY CATHETER, 5 CC, 3-WAY, 20 FR (6.7 MM): Brand: DOVER

## (undated) DEVICE — BAG,DRAINAGE,4L,A/R TOWER,LL,SLIDE TAP: Brand: MEDLINE

## (undated) DEVICE — GLV SURG BIOGEL M LTX PF 8

## (undated) DEVICE — GOWN,SIRUS,NONRNF,SETINSLV,2XL,18/CS: Brand: MEDLINE